# Patient Record
Sex: FEMALE | Race: WHITE | NOT HISPANIC OR LATINO | Employment: FULL TIME | URBAN - METROPOLITAN AREA
[De-identification: names, ages, dates, MRNs, and addresses within clinical notes are randomized per-mention and may not be internally consistent; named-entity substitution may affect disease eponyms.]

---

## 2020-07-07 ENCOUNTER — TRANSCRIBE ORDERS (OUTPATIENT)
Dept: ADMINISTRATIVE | Facility: HOSPITAL | Age: 40
End: 2020-07-07

## 2020-07-07 DIAGNOSIS — Z12.31 VISIT FOR SCREENING MAMMOGRAM: Primary | ICD-10-CM

## 2020-10-12 ENCOUNTER — APPOINTMENT (OUTPATIENT)
Dept: OTHER | Facility: HOSPITAL | Age: 40
End: 2020-10-12

## 2020-10-12 ENCOUNTER — HOSPITAL ENCOUNTER (OUTPATIENT)
Dept: MAMMOGRAPHY | Facility: HOSPITAL | Age: 40
Discharge: HOME | End: 2020-10-12
Admitting: STUDENT IN AN ORGANIZED HEALTH CARE EDUCATION/TRAINING PROGRAM

## 2020-10-12 DIAGNOSIS — Z12.31 VISIT FOR SCREENING MAMMOGRAM: ICD-10-CM

## 2020-10-12 PROCEDURE — 77063 BREAST TOMOSYNTHESIS BI: CPT

## 2020-10-12 PROCEDURE — 77067 SCR MAMMO BI INCL CAD: CPT

## 2020-10-12 PROCEDURE — 77063 BREAST TOMOSYNTHESIS BI: CPT | Performed by: RADIOLOGY

## 2020-10-12 PROCEDURE — 77067 SCR MAMMO BI INCL CAD: CPT | Performed by: RADIOLOGY

## 2021-05-06 ENCOUNTER — HOSPITAL ENCOUNTER (EMERGENCY)
Facility: HOSPITAL | Age: 41
Discharge: HOME | End: 2021-05-06
Attending: EMERGENCY MEDICINE | Admitting: EMERGENCY MEDICINE

## 2021-05-06 ENCOUNTER — APPOINTMENT (OUTPATIENT)
Dept: CT IMAGING | Facility: HOSPITAL | Age: 41
End: 2021-05-06

## 2021-05-06 ENCOUNTER — APPOINTMENT (OUTPATIENT)
Dept: GENERAL RADIOLOGY | Facility: HOSPITAL | Age: 41
End: 2021-05-06

## 2021-05-06 VITALS
TEMPERATURE: 98.7 F | BODY MASS INDEX: 31.23 KG/M2 | OXYGEN SATURATION: 96 % | HEIGHT: 67 IN | SYSTOLIC BLOOD PRESSURE: 123 MMHG | WEIGHT: 199 LBS | DIASTOLIC BLOOD PRESSURE: 75 MMHG | HEART RATE: 78 BPM | RESPIRATION RATE: 18 BRPM

## 2021-05-06 DIAGNOSIS — R07.9 CHEST PAIN, UNSPECIFIED TYPE: ICD-10-CM

## 2021-05-06 DIAGNOSIS — R06.02 SOB (SHORTNESS OF BREATH): ICD-10-CM

## 2021-05-06 DIAGNOSIS — M54.50 ACUTE LOW BACK PAIN WITHOUT SCIATICA, UNSPECIFIED BACK PAIN LATERALITY: Primary | ICD-10-CM

## 2021-05-06 LAB
ALBUMIN SERPL-MCNC: 4.3 G/DL (ref 3.5–5.2)
ALBUMIN/GLOB SERPL: 1.3 G/DL
ALP SERPL-CCNC: 108 U/L (ref 39–117)
ALT SERPL W P-5'-P-CCNC: 17 U/L (ref 1–33)
ANION GAP SERPL CALCULATED.3IONS-SCNC: 12 MMOL/L (ref 5–15)
AST SERPL-CCNC: 15 U/L (ref 1–32)
B-HCG UR QL: NEGATIVE
BASOPHILS # BLD AUTO: 0.07 10*3/MM3 (ref 0–0.2)
BASOPHILS NFR BLD AUTO: 0.5 % (ref 0–1.5)
BILIRUB SERPL-MCNC: 0.4 MG/DL (ref 0–1.2)
BUN SERPL-MCNC: 13 MG/DL (ref 6–20)
BUN/CREAT SERPL: 14.4 (ref 7–25)
CALCIUM SPEC-SCNC: 9.6 MG/DL (ref 8.6–10.5)
CHLORIDE SERPL-SCNC: 102 MMOL/L (ref 98–107)
CO2 SERPL-SCNC: 25 MMOL/L (ref 22–29)
CREAT SERPL-MCNC: 0.9 MG/DL (ref 0.57–1)
D DIMER PPP FEU-MCNC: 0.59 MCGFEU/ML (ref 0–0.56)
DEPRECATED RDW RBC AUTO: 44.9 FL (ref 37–54)
EOSINOPHIL # BLD AUTO: 0.06 10*3/MM3 (ref 0–0.4)
EOSINOPHIL NFR BLD AUTO: 0.5 % (ref 0.3–6.2)
ERYTHROCYTE [DISTWIDTH] IN BLOOD BY AUTOMATED COUNT: 14.7 % (ref 12.3–15.4)
FLUAV RNA RESP QL NAA+PROBE: NOT DETECTED
FLUBV RNA RESP QL NAA+PROBE: NOT DETECTED
GFR SERPL CREATININE-BSD FRML MDRD: 69 ML/MIN/1.73
GLOBULIN UR ELPH-MCNC: 3.3 GM/DL
GLUCOSE SERPL-MCNC: 89 MG/DL (ref 65–99)
HCT VFR BLD AUTO: 39.7 % (ref 34–46.6)
HGB BLD-MCNC: 12.5 G/DL (ref 12–15.9)
HOLD SPECIMEN: NORMAL
IMM GRANULOCYTES # BLD AUTO: 0.05 10*3/MM3 (ref 0–0.05)
IMM GRANULOCYTES NFR BLD AUTO: 0.4 % (ref 0–0.5)
INTERNAL NEGATIVE CONTROL: NEGATIVE
INTERNAL POSITIVE CONTROL: POSITIVE
LYMPHOCYTES # BLD AUTO: 1.72 10*3/MM3 (ref 0.7–3.1)
LYMPHOCYTES NFR BLD AUTO: 12.9 % (ref 19.6–45.3)
Lab: NORMAL
MCH RBC QN AUTO: 26 PG (ref 26.6–33)
MCHC RBC AUTO-ENTMCNC: 31.5 G/DL (ref 31.5–35.7)
MCV RBC AUTO: 82.5 FL (ref 79–97)
MONOCYTES # BLD AUTO: 0.58 10*3/MM3 (ref 0.1–0.9)
MONOCYTES NFR BLD AUTO: 4.4 % (ref 5–12)
NEUTROPHILS NFR BLD AUTO: 10.84 10*3/MM3 (ref 1.7–7)
NEUTROPHILS NFR BLD AUTO: 81.3 % (ref 42.7–76)
NRBC BLD AUTO-RTO: 0 /100 WBC (ref 0–0.2)
NT-PROBNP SERPL-MCNC: 243.4 PG/ML (ref 0–450)
PLATELET # BLD AUTO: 321 10*3/MM3 (ref 140–450)
PMV BLD AUTO: 10.1 FL (ref 6–12)
POTASSIUM SERPL-SCNC: 4 MMOL/L (ref 3.5–5.2)
PROT SERPL-MCNC: 7.6 G/DL (ref 6–8.5)
QT INTERVAL: 368 MS
QTC INTERVAL: 434 MS
RBC # BLD AUTO: 4.81 10*6/MM3 (ref 3.77–5.28)
SARS-COV-2 RNA RESP QL NAA+PROBE: NOT DETECTED
SODIUM SERPL-SCNC: 139 MMOL/L (ref 136–145)
TROPONIN T SERPL-MCNC: <0.01 NG/ML (ref 0–0.03)
WBC # BLD AUTO: 13.32 10*3/MM3 (ref 3.4–10.8)
WHOLE BLOOD HOLD SPECIMEN: NORMAL
WHOLE BLOOD HOLD SPECIMEN: NORMAL

## 2021-05-06 PROCEDURE — 84484 ASSAY OF TROPONIN QUANT: CPT

## 2021-05-06 PROCEDURE — 85025 COMPLETE CBC W/AUTO DIFF WBC: CPT

## 2021-05-06 PROCEDURE — 71275 CT ANGIOGRAPHY CHEST: CPT

## 2021-05-06 PROCEDURE — 93005 ELECTROCARDIOGRAM TRACING: CPT

## 2021-05-06 PROCEDURE — 83880 ASSAY OF NATRIURETIC PEPTIDE: CPT

## 2021-05-06 PROCEDURE — 99284 EMERGENCY DEPT VISIT MOD MDM: CPT

## 2021-05-06 PROCEDURE — 96374 THER/PROPH/DIAG INJ IV PUSH: CPT

## 2021-05-06 PROCEDURE — 80053 COMPREHEN METABOLIC PANEL: CPT

## 2021-05-06 PROCEDURE — 85379 FIBRIN DEGRADATION QUANT: CPT | Performed by: PHYSICIAN ASSISTANT

## 2021-05-06 PROCEDURE — 93005 ELECTROCARDIOGRAM TRACING: CPT | Performed by: EMERGENCY MEDICINE

## 2021-05-06 PROCEDURE — 81025 URINE PREGNANCY TEST: CPT | Performed by: EMERGENCY MEDICINE

## 2021-05-06 PROCEDURE — 87636 SARSCOV2 & INF A&B AMP PRB: CPT | Performed by: PHYSICIAN ASSISTANT

## 2021-05-06 PROCEDURE — 25010000002 KETOROLAC TROMETHAMINE PER 15 MG: Performed by: PHYSICIAN ASSISTANT

## 2021-05-06 PROCEDURE — 71045 X-RAY EXAM CHEST 1 VIEW: CPT

## 2021-05-06 PROCEDURE — 96375 TX/PRO/DX INJ NEW DRUG ADDON: CPT

## 2021-05-06 PROCEDURE — 0 IOPAMIDOL PER 1 ML: Performed by: EMERGENCY MEDICINE

## 2021-05-06 RX ORDER — SODIUM CHLORIDE 0.9 % (FLUSH) 0.9 %
10 SYRINGE (ML) INJECTION AS NEEDED
Status: DISCONTINUED | OUTPATIENT
Start: 2021-05-06 | End: 2021-05-06 | Stop reason: HOSPADM

## 2021-05-06 RX ORDER — BUPROPION HYDROCHLORIDE 150 MG/1
450 TABLET ORAL DAILY
COMMUNITY

## 2021-05-06 RX ORDER — KETOROLAC TROMETHAMINE 30 MG/ML
30 INJECTION, SOLUTION INTRAMUSCULAR; INTRAVENOUS ONCE
Status: COMPLETED | OUTPATIENT
Start: 2021-05-06 | End: 2021-05-06

## 2021-05-06 RX ORDER — SERTRALINE HYDROCHLORIDE 100 MG/1
200 TABLET, FILM COATED ORAL DAILY
COMMUNITY

## 2021-05-06 RX ORDER — FAMOTIDINE 10 MG/ML
20 INJECTION, SOLUTION INTRAVENOUS ONCE
Status: COMPLETED | OUTPATIENT
Start: 2021-05-06 | End: 2021-05-06

## 2021-05-06 RX ADMIN — KETOROLAC TROMETHAMINE 30 MG: 30 INJECTION, SOLUTION INTRAMUSCULAR; INTRAVENOUS at 14:13

## 2021-05-06 RX ADMIN — FAMOTIDINE 20 MG: 10 INJECTION, SOLUTION INTRAVENOUS at 14:15

## 2021-05-06 RX ADMIN — IOPAMIDOL 75 ML: 755 INJECTION, SOLUTION INTRAVENOUS at 16:23

## 2021-10-08 ENCOUNTER — TRANSCRIBE ORDERS (OUTPATIENT)
Dept: ADMINISTRATIVE | Facility: HOSPITAL | Age: 41
End: 2021-10-08

## 2021-10-08 DIAGNOSIS — Z12.31 VISIT FOR SCREENING MAMMOGRAM: Primary | ICD-10-CM

## 2021-11-22 ENCOUNTER — APPOINTMENT (OUTPATIENT)
Dept: MAMMOGRAPHY | Facility: HOSPITAL | Age: 41
End: 2021-11-22

## 2021-12-28 ENCOUNTER — HOSPITAL ENCOUNTER (OUTPATIENT)
Dept: MAMMOGRAPHY | Facility: HOSPITAL | Age: 41
Discharge: HOME | End: 2021-12-28
Admitting: ADVANCED PRACTICE MIDWIFE

## 2021-12-28 DIAGNOSIS — Z12.31 VISIT FOR SCREENING MAMMOGRAM: ICD-10-CM

## 2021-12-28 PROCEDURE — 77067 SCR MAMMO BI INCL CAD: CPT

## 2021-12-28 PROCEDURE — 77063 BREAST TOMOSYNTHESIS BI: CPT

## 2021-12-28 PROCEDURE — 77067 SCR MAMMO BI INCL CAD: CPT | Performed by: RADIOLOGY

## 2021-12-28 PROCEDURE — 77063 BREAST TOMOSYNTHESIS BI: CPT | Performed by: RADIOLOGY

## 2022-02-04 ENCOUNTER — TRANSCRIBE ORDERS (OUTPATIENT)
Dept: ADMINISTRATIVE | Facility: HOSPITAL | Age: 42
End: 2022-02-04

## 2022-02-04 DIAGNOSIS — Z82.49 FAMILY HISTORY OF HYPERTROPHIC CARDIOMYOPATHY: Primary | ICD-10-CM

## 2022-02-11 ENCOUNTER — HOSPITAL ENCOUNTER (OUTPATIENT)
Dept: CARDIOLOGY | Facility: HOSPITAL | Age: 42
Discharge: HOME | End: 2022-02-11
Admitting: STUDENT IN AN ORGANIZED HEALTH CARE EDUCATION/TRAINING PROGRAM

## 2022-02-11 DIAGNOSIS — Z82.49 FAMILY HISTORY OF HYPERTROPHIC CARDIOMYOPATHY: ICD-10-CM

## 2022-02-11 LAB
BH CV ECHO MEAS - AO MAX PG (FULL): 3.9 MMHG
BH CV ECHO MEAS - AO MAX PG: 11 MMHG
BH CV ECHO MEAS - AO MEAN PG (FULL): 2.3 MMHG
BH CV ECHO MEAS - AO MEAN PG: 5.6 MMHG
BH CV ECHO MEAS - AO ROOT AREA (BSA CORRECTED): 1.6
BH CV ECHO MEAS - AO ROOT AREA: 8.4 CM^2
BH CV ECHO MEAS - AO ROOT DIAM: 3.3 CM
BH CV ECHO MEAS - AO V2 MAX: 165.6 CM/SEC
BH CV ECHO MEAS - AO V2 MEAN: 110.6 CM/SEC
BH CV ECHO MEAS - AO V2 VTI: 38.9 CM
BH CV ECHO MEAS - AVA(I,A): 2 CM^2
BH CV ECHO MEAS - AVA(I,D): 2 CM^2
BH CV ECHO MEAS - AVA(V,A): 1.9 CM^2
BH CV ECHO MEAS - AVA(V,D): 1.9 CM^2
BH CV ECHO MEAS - BSA(HAYCOCK): 2.1 M^2
BH CV ECHO MEAS - BSA: 2 M^2
BH CV ECHO MEAS - BZI_BMI: 31.2 KILOGRAMS/M^2
BH CV ECHO MEAS - BZI_METRIC_HEIGHT: 170.2 CM
BH CV ECHO MEAS - BZI_METRIC_WEIGHT: 90.3 KG
BH CV ECHO MEAS - EDV(CUBED): 33.2 ML
BH CV ECHO MEAS - EDV(MOD-SP2): 160 ML
BH CV ECHO MEAS - EDV(MOD-SP4): 134 ML
BH CV ECHO MEAS - EDV(TEICH): 41.4 ML
BH CV ECHO MEAS - EF(CUBED): 3.9 %
BH CV ECHO MEAS - EF(MOD-SP2): 54.4 %
BH CV ECHO MEAS - EF(MOD-SP4): 46.3 %
BH CV ECHO MEAS - EF(TEICH): 3.2 %
BH CV ECHO MEAS - ESV(CUBED): 31.9 ML
BH CV ECHO MEAS - ESV(MOD-SP2): 73 ML
BH CV ECHO MEAS - ESV(MOD-SP4): 72 ML
BH CV ECHO MEAS - ESV(TEICH): 40.1 ML
BH CV ECHO MEAS - FS: 1.3 %
BH CV ECHO MEAS - IVS/LVPW: 3.3
BH CV ECHO MEAS - IVSD: 3 CM
BH CV ECHO MEAS - LA DIMENSION: 3.8 CM
BH CV ECHO MEAS - LA/AO: 1.2
BH CV ECHO MEAS - LAD MAJOR: 5.5 CM
BH CV ECHO MEAS - LAT PEAK E' VEL: 14.3 CM/SEC
BH CV ECHO MEAS - LATERAL E/E' RATIO: 7.8
BH CV ECHO MEAS - LV DIASTOLIC VOL/BSA (35-75): 66.4 ML/M^2
BH CV ECHO MEAS - LV MASS(C)D: 268 GRAMS
BH CV ECHO MEAS - LV MASS(C)DI: 132.8 GRAMS/M^2
BH CV ECHO MEAS - LV MAX PG: 7.1 MMHG
BH CV ECHO MEAS - LV MEAN PG: 3.3 MMHG
BH CV ECHO MEAS - LV SYSTOLIC VOL/BSA (12-30): 35.7 ML/M^2
BH CV ECHO MEAS - LV V1 MAX: 132.8 CM/SEC
BH CV ECHO MEAS - LV V1 MEAN: 82 CM/SEC
BH CV ECHO MEAS - LV V1 VTI: 33.5 CM
BH CV ECHO MEAS - LVIDD: 3.2 CM
BH CV ECHO MEAS - LVIDS: 3.2 CM
BH CV ECHO MEAS - LVLD AP2: 9.4 CM
BH CV ECHO MEAS - LVLD AP4: 9.1 CM
BH CV ECHO MEAS - LVLS AP2: 7.5 CM
BH CV ECHO MEAS - LVLS AP4: 7.3 CM
BH CV ECHO MEAS - LVOT AREA (M): 2.3 CM^2
BH CV ECHO MEAS - LVOT AREA: 2.4 CM^2
BH CV ECHO MEAS - LVOT DIAM: 1.7 CM
BH CV ECHO MEAS - LVPWD: 0.89 CM
BH CV ECHO MEAS - MED PEAK E' VEL: 11.4 CM/SEC
BH CV ECHO MEAS - MEDIAL E/E' RATIO: 9.7
BH CV ECHO MEAS - MV A MAX VEL: 87.4 CM/SEC
BH CV ECHO MEAS - MV DEC SLOPE: 453.5 CM/SEC^2
BH CV ECHO MEAS - MV DEC TIME: 0.17 SEC
BH CV ECHO MEAS - MV E MAX VEL: 112 CM/SEC
BH CV ECHO MEAS - MV E/A: 1.3
BH CV ECHO MEAS - MV MAX PG: 4.4 MMHG
BH CV ECHO MEAS - MV MEAN PG: 1.8 MMHG
BH CV ECHO MEAS - MV P1/2T MAX VEL: 115.4 CM/SEC
BH CV ECHO MEAS - MV P1/2T: 74.5 MSEC
BH CV ECHO MEAS - MV V2 MAX: 104.5 CM/SEC
BH CV ECHO MEAS - MV V2 MEAN: 61 CM/SEC
BH CV ECHO MEAS - MV V2 VTI: 30.5 CM
BH CV ECHO MEAS - MVA P1/2T LCG: 1.9 CM^2
BH CV ECHO MEAS - MVA(P1/2T): 3 CM^2
BH CV ECHO MEAS - MVA(VTI): 2.6 CM^2
BH CV ECHO MEAS - PA ACC SLOPE: 583 CM/SEC^2
BH CV ECHO MEAS - PA ACC TIME: 0.15 SEC
BH CV ECHO MEAS - PA PR(ACCEL): 9.3 MMHG
BH CV ECHO MEAS - SI(AO): 161.2 ML/M^2
BH CV ECHO MEAS - SI(CUBED): 0.64 ML/M^2
BH CV ECHO MEAS - SI(LVOT): 39.4 ML/M^2
BH CV ECHO MEAS - SI(MOD-SP2): 43.1 ML/M^2
BH CV ECHO MEAS - SI(MOD-SP4): 30.7 ML/M^2
BH CV ECHO MEAS - SI(TEICH): 0.65 ML/M^2
BH CV ECHO MEAS - SV(AO): 325.2 ML
BH CV ECHO MEAS - SV(CUBED): 1.3 ML
BH CV ECHO MEAS - SV(LVOT): 79.4 ML
BH CV ECHO MEAS - SV(MOD-SP2): 87 ML
BH CV ECHO MEAS - SV(MOD-SP4): 62 ML
BH CV ECHO MEAS - SV(TEICH): 1.3 ML
BH CV ECHO MEAS - TAPSE (>1.6): 2.2 CM
BH CV ECHO MEASUREMENTS AVERAGE E/E' RATIO: 8.72
BH CV VAS BP LEFT ARM: NORMAL MMHG
BH CV XLRA - RV BASE: 4.5 CM
BH CV XLRA - RV LENGTH: 5.6 CM
BH CV XLRA - RV MID: 3.2 CM
BH CV XLRA - TDI S': 17.4 CM/SEC
LEFT ATRIUM VOLUME INDEX: 28.2 ML/M^2
LEFT ATRIUM VOLUME: 57 ML
MAXIMAL PREDICTED HEART RATE: 179 BPM
STRESS TARGET HR: 152 BPM

## 2022-02-11 PROCEDURE — 93306 TTE W/DOPPLER COMPLETE: CPT

## 2022-02-11 PROCEDURE — 93306 TTE W/DOPPLER COMPLETE: CPT | Performed by: INTERNAL MEDICINE

## 2022-03-07 ENCOUNTER — HOSPITAL ENCOUNTER (OUTPATIENT)
Dept: GENERAL RADIOLOGY | Facility: HOSPITAL | Age: 42
Discharge: HOME | End: 2022-03-07
Admitting: STUDENT IN AN ORGANIZED HEALTH CARE EDUCATION/TRAINING PROGRAM

## 2022-03-07 ENCOUNTER — TRANSCRIBE ORDERS (OUTPATIENT)
Dept: ADMINISTRATIVE | Facility: HOSPITAL | Age: 42
End: 2022-03-07

## 2022-03-07 DIAGNOSIS — M79.89 SWELLING OF LEFT LITTLE FINGER: Primary | ICD-10-CM

## 2022-03-07 PROCEDURE — 73120 X-RAY EXAM OF HAND: CPT

## 2022-04-06 ENCOUNTER — APPOINTMENT (OUTPATIENT)
Dept: GENERAL RADIOLOGY | Facility: HOSPITAL | Age: 42
End: 2022-04-06

## 2022-04-06 ENCOUNTER — HOSPITAL ENCOUNTER (EMERGENCY)
Facility: HOSPITAL | Age: 42
Discharge: HOME | End: 2022-04-06
Attending: EMERGENCY MEDICINE | Admitting: EMERGENCY MEDICINE

## 2022-04-06 VITALS
HEIGHT: 67 IN | TEMPERATURE: 97.8 F | HEART RATE: 75 BPM | BODY MASS INDEX: 31.39 KG/M2 | OXYGEN SATURATION: 97 % | SYSTOLIC BLOOD PRESSURE: 148 MMHG | DIASTOLIC BLOOD PRESSURE: 82 MMHG | WEIGHT: 200 LBS | RESPIRATION RATE: 16 BRPM

## 2022-04-06 DIAGNOSIS — S09.90XA INJURY OF HEAD, INITIAL ENCOUNTER: ICD-10-CM

## 2022-04-06 DIAGNOSIS — S00.33XA CONTUSION OF NOSE, INITIAL ENCOUNTER: Primary | ICD-10-CM

## 2022-04-06 PROCEDURE — 70150 X-RAY EXAM OF FACIAL BONES: CPT

## 2022-04-06 PROCEDURE — 99282 EMERGENCY DEPT VISIT SF MDM: CPT

## 2022-04-06 NOTE — ED PROVIDER NOTES
Subjective   Pt is a 42 yo female presenting to ED with complaints of nose pain. Pt reports PTA was at work and during a game accidentally had her face collide with a coworker. She initially had a nosebleed but that resolved. She has swelling and bruising to her nose. She denies LOC. She denies taking blood thinners. She denies vision changes, confusion, headache, dizziness, neck pain or any other complaints.       History provided by:  Patient and medical records      Review of Systems   Constitutional: Negative for fever.   HENT: Positive for facial swelling and nosebleeds. Negative for congestion.    Eyes: Negative for visual disturbance.   Respiratory: Negative for cough and shortness of breath.    Cardiovascular: Negative for chest pain.   Musculoskeletal: Negative for arthralgias, back pain and neck pain.   Skin: Negative for wound.   Neurological: Negative for dizziness, syncope, weakness, numbness and headaches.   Psychiatric/Behavioral: Negative for confusion.       No past medical history on file.    No Known Allergies    No past surgical history on file.    Family History   Problem Relation Age of Onset   • Breast cancer Maternal Aunt 64   • Breast cancer Paternal Aunt 54   • Breast cancer Maternal Great-Grandmother 80   • Breast cancer Paternal Great-Grandmother 62   • Breast cancer Paternal Grandmother         60's   • Breast cancer Maternal Aunt 58   • Breast cancer Maternal Aunt 60   • Ovarian cancer Neg Hx        Social History     Socioeconomic History   • Marital status:    Tobacco Use   • Smoking status: Never Smoker   Substance and Sexual Activity   • Alcohol use: Yes     Comment: 5 drinks a week   • Drug use: Not Currently   • Sexual activity: Defer           Objective   Physical Exam  Vitals and nursing note reviewed.   Constitutional:       General: She is not in acute distress.     Appearance: She is normal weight.   HENT:      Nose: Nasal deformity (slight deviation to left), signs of  injury and nasal tenderness (contusion / swelling) present. No laceration.      Right Nostril: Epistaxis (dried blood) present. No septal hematoma.      Left Nostril: Epistaxis (dried blood) present. No septal hematoma.      Mouth/Throat:      Mouth: Mucous membranes are moist.   Eyes:      Extraocular Movements: Extraocular movements intact.      Conjunctiva/sclera: Conjunctivae normal.      Pupils: Pupils are equal, round, and reactive to light.   Cardiovascular:      Rate and Rhythm: Normal rate.   Pulmonary:      Effort: Pulmonary effort is normal. No respiratory distress.   Musculoskeletal:         General: Normal range of motion.      Cervical back: Normal range of motion and neck supple. No tenderness. Normal range of motion.   Skin:     General: Skin is warm.   Neurological:      General: No focal deficit present.      Mental Status: She is alert and oriented to person, place, and time.   Psychiatric:         Mood and Affect: Mood normal.         Behavior: Behavior normal.         Procedures           ED Course      Discussed results and tx plan. Discussed ice / Tylenol and Motrin. Discussed f/u with ENT.     No results found for this or any previous visit (from the past 24 hour(s)).  Note: In addition to lab results from this visit, the labs listed above may include labs taken at another facility or during a different encounter within the last 24 hours. Please correlate lab times with ED admission and discharge times for further clarification of the services performed during this visit.    XR Facial Bones 3+ View   Final Result   No evidence of fracture in the nasal bone or elsewhere, no evidence of   trauma.       Rounded 2 cm lesion inferior right maxillary sinus most likely a mucus   retention cyst       This report was finalized on 4/6/2022 5:18 PM by Damon Encarnaicon.            Vitals:    04/06/22 1629   BP: 148/82   BP Location: Left arm   Patient Position: Sitting   Pulse: 75   Resp: 16   Temp: 97.8 °F  "(36.6 °C)   TempSrc: Oral   SpO2: 97%   Weight: 90.7 kg (200 lb)   Height: 170.2 cm (67\")     Medications - No data to display  ECG/EMG Results (last 24 hours)     ** No results found for the last 24 hours. **        No orders to display       DISCHARGE    Patient discharged in stable condition.    Reviewed implications of results, diagnosis, meds, responsibility to follow up, warning signs and symptoms of possible worsening, potential complications and reasons to return to ER.    Patient/Family voiced understanding of above instructions.    Discussed plan for discharge, as there is no emergent indication for admission.  Pt/family is agreeable and understands need for follow up and possible repeat testing.  Pt/family is aware that discharge does not mean that nothing is wrong but that it indicates no emergency is currently present that requires admission and they must continue care with follow-up as given below or with a physician of their choice.     FOLLOW-UP  Susan Beck MD  [address] [address]  [phone]    Schedule an appointment as soon as possible for a visit       Enoch Garcia MD  [address] [place] [address] [address]  [place] [address]  [phone]      As needed    [facility] Emergency Department  [address] [id number] [phone]    If symptoms worsen         Medication List      No changes were made to your prescriptions during this visit.                                                  MDM    Final diagnoses:   Contusion of nose, initial encounter   Injury of head, initial encounter       ED Disposition  ED Disposition     ED Disposition   Discharge    Condition   Stable    Comment   --             Susan Beck MD  [address] [facility] [address]  [place] [address]  [phone]    Schedule an appointment as soon as possible for a visit       Enoch Garcia MD  [address] [facility] [address]  [place] [address]  [phone]      As needed    [facility] Emergency Department  [address] [facility] [address] [phone]    If symptoms worsen         Medication List      No changes were made to your prescriptions during this visit.          Jesusita Wilson PA  04/06/22 1808

## 2022-04-06 NOTE — DISCHARGE INSTRUCTIONS
PROVIDED NASAL FRACTURE INFORMATION JUST AS FYI. XRAY DID NOT SHOW ANY NASAL FRACTURE IN ED TODAY.

## 2022-06-28 ENCOUNTER — HOSPITAL ENCOUNTER (OUTPATIENT)
Dept: MRI IMAGING | Facility: HOSPITAL | Age: 42
Discharge: HOME | End: 2022-06-28

## 2022-06-28 DIAGNOSIS — Z80.3 FAMILY HISTORY OF MALIGNANT NEOPLASM OF BREAST: ICD-10-CM

## 2022-06-28 PROCEDURE — 77049 MRI BREAST C-+ W/CAD BI: CPT

## 2022-06-28 PROCEDURE — 0 GADOBENATE DIMEGLUMINE 529 MG/ML SOLUTION: Performed by: ADVANCED PRACTICE MIDWIFE

## 2022-06-28 PROCEDURE — A9577 INJ MULTIHANCE: HCPCS | Performed by: ADVANCED PRACTICE MIDWIFE

## 2022-06-28 PROCEDURE — 77049 MRI BREAST C-+ W/CAD BI: CPT | Performed by: RADIOLOGY

## 2022-06-28 RX ADMIN — GADOBENATE DIMEGLUMINE 20 ML: 529 INJECTION, SOLUTION INTRAVENOUS at 11:08

## 2022-06-29 ENCOUNTER — TELEPHONE (OUTPATIENT)
Dept: MRI IMAGING | Facility: HOSPITAL | Age: 42
End: 2022-06-29

## 2022-06-29 NOTE — TELEPHONE ENCOUNTER
Called patient with MRI Breast results. Recommended Dx MMG and og 2nd looks scheduled for 6/30 at 8:00 at the [address] b[address]. Pt not on BT.  Pt expressed understanding and was encouraged to call with any further questions or concerns.

## 2022-06-30 ENCOUNTER — HOSPITAL ENCOUNTER (OUTPATIENT)
Dept: ULTRASOUND IMAGING | Facility: HOSPITAL | Age: 42
Discharge: HOME | End: 2022-06-30

## 2022-06-30 ENCOUNTER — HOSPITAL ENCOUNTER (OUTPATIENT)
Dept: MAMMOGRAPHY | Facility: HOSPITAL | Age: 42
Discharge: HOME | End: 2022-06-30

## 2022-06-30 DIAGNOSIS — Z80.3 FAMILY HISTORY OF MALIGNANT NEOPLASM OF BREAST: ICD-10-CM

## 2022-06-30 DIAGNOSIS — R92.8 ABNORMAL MAGNETIC RESONANCE IMAGING OF BREAST: ICD-10-CM

## 2022-06-30 PROCEDURE — G0279 TOMOSYNTHESIS, MAMMO: HCPCS

## 2022-06-30 PROCEDURE — 77066 DX MAMMO INCL CAD BI: CPT

## 2022-06-30 PROCEDURE — 76642 ULTRASOUND BREAST LIMITED: CPT

## 2022-06-30 PROCEDURE — 77066 DX MAMMO INCL CAD BI: CPT | Performed by: RADIOLOGY

## 2022-06-30 PROCEDURE — 76642 ULTRASOUND BREAST LIMITED: CPT | Performed by: RADIOLOGY

## 2022-06-30 PROCEDURE — 77062 BREAST TOMOSYNTHESIS BI: CPT | Performed by: RADIOLOGY

## 2022-07-01 ENCOUNTER — TELEPHONE (OUTPATIENT)
Dept: MRI IMAGING | Facility: HOSPITAL | Age: 42
End: 2022-07-01

## 2022-07-01 NOTE — TELEPHONE ENCOUNTER
Called pt and she is on vacation on 7/20 so will be scheduled for 7.27 at 8:00 for her MRI biopsies. Procedures explained in deatil. Pt expressed understanding.

## 2022-07-27 ENCOUNTER — HOSPITAL ENCOUNTER (OUTPATIENT)
Dept: MRI IMAGING | Facility: HOSPITAL | Age: 42
Discharge: HOME | End: 2022-07-27

## 2022-07-27 ENCOUNTER — HOSPITAL ENCOUNTER (OUTPATIENT)
Dept: MAMMOGRAPHY | Facility: HOSPITAL | Age: 42
Discharge: HOME | End: 2022-07-27

## 2022-07-27 DIAGNOSIS — R92.8 ABNORMAL FINDINGS ON DIAGNOSTIC IMAGING OF BREAST: ICD-10-CM

## 2022-07-27 PROCEDURE — 0 LIDOCAINE 1 % SOLUTION: Performed by: RADIOLOGY

## 2022-07-27 PROCEDURE — A4648 IMPLANTABLE TISSUE MARKER: HCPCS

## 2022-07-27 PROCEDURE — A9577 INJ MULTIHANCE: HCPCS | Performed by: ADVANCED PRACTICE MIDWIFE

## 2022-07-27 PROCEDURE — 88305 TISSUE EXAM BY PATHOLOGIST: CPT | Performed by: RADIOLOGY

## 2022-07-27 PROCEDURE — 19085 BX BREAST 1ST LESION MR IMAG: CPT | Performed by: RADIOLOGY

## 2022-07-27 PROCEDURE — 0 GADOBENATE DIMEGLUMINE 529 MG/ML SOLUTION: Performed by: ADVANCED PRACTICE MIDWIFE

## 2022-07-27 PROCEDURE — 77065 DX MAMMO INCL CAD UNI: CPT | Performed by: RADIOLOGY

## 2022-07-27 RX ORDER — LIDOCAINE HYDROCHLORIDE AND EPINEPHRINE 10; 10 MG/ML; UG/ML
9 INJECTION, SOLUTION INFILTRATION; PERINEURAL
Status: COMPLETED | OUTPATIENT
Start: 2022-07-27 | End: 2022-07-27

## 2022-07-27 RX ORDER — LIDOCAINE HYDROCHLORIDE 10 MG/ML
2 INJECTION, SOLUTION INFILTRATION; PERINEURAL
Status: COMPLETED | OUTPATIENT
Start: 2022-07-27 | End: 2022-07-27

## 2022-07-27 RX ADMIN — LIDOCAINE HYDROCHLORIDE,EPINEPHRINE BITARTRATE 9 ML: 10; .01 INJECTION, SOLUTION INFILTRATION; PERINEURAL at 09:36

## 2022-07-27 RX ADMIN — GADOBENATE DIMEGLUMINE 18 ML: 529 INJECTION, SOLUTION INTRAVENOUS at 09:35

## 2022-07-27 RX ADMIN — Medication 2 ML: at 09:36

## 2022-07-28 LAB
CYTO UR: NORMAL
LAB AP CASE REPORT: NORMAL
LAB AP CLINICAL INFORMATION: NORMAL
LAB AP DIAGNOSIS COMMENT: NORMAL
PATH REPORT.FINAL DX SPEC: NORMAL
PATH REPORT.GROSS SPEC: NORMAL

## 2022-08-01 ENCOUNTER — TELEPHONE (OUTPATIENT)
Dept: MAMMOGRAPHY | Facility: HOSPITAL | Age: 42
End: 2022-08-01

## 2023-01-03 ENCOUNTER — APPOINTMENT (OUTPATIENT)
Dept: CT IMAGING | Facility: HOSPITAL | Age: 43
End: 2023-01-03
Payer: COMMERCIAL

## 2023-01-03 ENCOUNTER — APPOINTMENT (OUTPATIENT)
Dept: GENERAL RADIOLOGY | Facility: HOSPITAL | Age: 43
End: 2023-01-03
Payer: COMMERCIAL

## 2023-01-03 ENCOUNTER — HOSPITAL ENCOUNTER (EMERGENCY)
Facility: HOSPITAL | Age: 43
Discharge: HOME | End: 2023-01-03
Attending: EMERGENCY MEDICINE | Admitting: EMERGENCY MEDICINE
Payer: COMMERCIAL

## 2023-01-03 VITALS
DIASTOLIC BLOOD PRESSURE: 77 MMHG | TEMPERATURE: 98.4 F | SYSTOLIC BLOOD PRESSURE: 130 MMHG | HEART RATE: 84 BPM | OXYGEN SATURATION: 97 % | RESPIRATION RATE: 20 BRPM | BODY MASS INDEX: 31.39 KG/M2 | HEIGHT: 67 IN | WEIGHT: 200 LBS

## 2023-01-03 DIAGNOSIS — R07.9 CHEST PAIN, UNSPECIFIED TYPE: Primary | ICD-10-CM

## 2023-01-03 DIAGNOSIS — D72.829 LEUKOCYTOSIS, UNSPECIFIED TYPE: ICD-10-CM

## 2023-01-03 LAB
ALBUMIN SERPL-MCNC: 4.2 G/DL (ref 3.5–5.2)
ALBUMIN/GLOB SERPL: 1.1 G/DL
ALP SERPL-CCNC: 103 U/L (ref 39–117)
ALT SERPL W P-5'-P-CCNC: 17 U/L (ref 1–33)
ANION GAP SERPL CALCULATED.3IONS-SCNC: 12 MMOL/L (ref 5–15)
AST SERPL-CCNC: 15 U/L (ref 1–32)
BASOPHILS # BLD AUTO: 0.06 10*3/MM3 (ref 0–0.2)
BASOPHILS NFR BLD AUTO: 0.4 % (ref 0–1.5)
BILIRUB SERPL-MCNC: 0.5 MG/DL (ref 0–1.2)
BUN SERPL-MCNC: 13 MG/DL (ref 6–20)
BUN/CREAT SERPL: 15.7 (ref 7–25)
CALCIUM SPEC-SCNC: 9.6 MG/DL (ref 8.6–10.5)
CHLORIDE SERPL-SCNC: 102 MMOL/L (ref 98–107)
CO2 SERPL-SCNC: 24 MMOL/L (ref 22–29)
CREAT SERPL-MCNC: 0.83 MG/DL (ref 0.57–1)
DEPRECATED RDW RBC AUTO: 45.4 FL (ref 37–54)
EGFRCR SERPLBLD CKD-EPI 2021: 90.4 ML/MIN/1.73
EOSINOPHIL # BLD AUTO: 0.05 10*3/MM3 (ref 0–0.4)
EOSINOPHIL NFR BLD AUTO: 0.3 % (ref 0.3–6.2)
ERYTHROCYTE [DISTWIDTH] IN BLOOD BY AUTOMATED COUNT: 14.9 % (ref 12.3–15.4)
FLUAV RNA RESP QL NAA+PROBE: NOT DETECTED
FLUBV RNA RESP QL NAA+PROBE: NOT DETECTED
GLOBULIN UR ELPH-MCNC: 3.7 GM/DL
GLUCOSE SERPL-MCNC: 101 MG/DL (ref 65–99)
HCT VFR BLD AUTO: 38.6 % (ref 34–46.6)
HGB BLD-MCNC: 12.5 G/DL (ref 12–15.9)
HOLD SPECIMEN: NORMAL
IMM GRANULOCYTES # BLD AUTO: 0.09 10*3/MM3 (ref 0–0.05)
IMM GRANULOCYTES NFR BLD AUTO: 0.5 % (ref 0–0.5)
LIPASE SERPL-CCNC: 16 U/L (ref 13–60)
LYMPHOCYTES # BLD AUTO: 1.67 10*3/MM3 (ref 0.7–3.1)
LYMPHOCYTES NFR BLD AUTO: 10 % (ref 19.6–45.3)
MCH RBC QN AUTO: 26.9 PG (ref 26.6–33)
MCHC RBC AUTO-ENTMCNC: 32.4 G/DL (ref 31.5–35.7)
MCV RBC AUTO: 83.2 FL (ref 79–97)
MONOCYTES # BLD AUTO: 0.69 10*3/MM3 (ref 0.1–0.9)
MONOCYTES NFR BLD AUTO: 4.1 % (ref 5–12)
NEUTROPHILS NFR BLD AUTO: 14.16 10*3/MM3 (ref 1.7–7)
NEUTROPHILS NFR BLD AUTO: 84.7 % (ref 42.7–76)
NRBC BLD AUTO-RTO: 0 /100 WBC (ref 0–0.2)
NT-PROBNP SERPL-MCNC: 356.3 PG/ML (ref 0–450)
PLATELET # BLD AUTO: 239 10*3/MM3 (ref 140–450)
PMV BLD AUTO: 9.7 FL (ref 6–12)
POTASSIUM SERPL-SCNC: 3.7 MMOL/L (ref 3.5–5.2)
PROCALCITONIN SERPL-MCNC: 0.1 NG/ML (ref 0–0.25)
PROT SERPL-MCNC: 7.9 G/DL (ref 6–8.5)
QT INTERVAL: 352 MS
QT INTERVAL: 380 MS
QTC INTERVAL: 435 MS
QTC INTERVAL: 441 MS
RBC # BLD AUTO: 4.64 10*6/MM3 (ref 3.77–5.28)
SARS-COV-2 RNA RESP QL NAA+PROBE: NOT DETECTED
SODIUM SERPL-SCNC: 138 MMOL/L (ref 136–145)
TROPONIN T SERPL-MCNC: <0.01 NG/ML (ref 0–0.03)
TROPONIN T SERPL-MCNC: <0.01 NG/ML (ref 0–0.03)
WBC NRBC COR # BLD: 16.72 10*3/MM3 (ref 3.4–10.8)
WHOLE BLOOD HOLD COAG: NORMAL
WHOLE BLOOD HOLD SPECIMEN: NORMAL

## 2023-01-03 PROCEDURE — 84484 ASSAY OF TROPONIN QUANT: CPT | Performed by: EMERGENCY MEDICINE

## 2023-01-03 PROCEDURE — 0 IOPAMIDOL PER 1 ML: Performed by: EMERGENCY MEDICINE

## 2023-01-03 PROCEDURE — 80053 COMPREHEN METABOLIC PANEL: CPT | Performed by: EMERGENCY MEDICINE

## 2023-01-03 PROCEDURE — 87636 SARSCOV2 & INF A&B AMP PRB: CPT | Performed by: EMERGENCY MEDICINE

## 2023-01-03 PROCEDURE — 71275 CT ANGIOGRAPHY CHEST: CPT

## 2023-01-03 PROCEDURE — 84145 PROCALCITONIN (PCT): CPT | Performed by: EMERGENCY MEDICINE

## 2023-01-03 PROCEDURE — 93005 ELECTROCARDIOGRAM TRACING: CPT

## 2023-01-03 PROCEDURE — 85025 COMPLETE CBC W/AUTO DIFF WBC: CPT

## 2023-01-03 PROCEDURE — 83690 ASSAY OF LIPASE: CPT | Performed by: EMERGENCY MEDICINE

## 2023-01-03 PROCEDURE — 99284 EMERGENCY DEPT VISIT MOD MDM: CPT

## 2023-01-03 PROCEDURE — 71045 X-RAY EXAM CHEST 1 VIEW: CPT

## 2023-01-03 PROCEDURE — 83880 ASSAY OF NATRIURETIC PEPTIDE: CPT | Performed by: EMERGENCY MEDICINE

## 2023-01-03 PROCEDURE — 93005 ELECTROCARDIOGRAM TRACING: CPT | Performed by: EMERGENCY MEDICINE

## 2023-01-03 PROCEDURE — 36415 COLL VENOUS BLD VENIPUNCTURE: CPT

## 2023-01-03 RX ORDER — ASPIRIN 81 MG/1
324 TABLET, CHEWABLE ORAL ONCE
Status: COMPLETED | OUTPATIENT
Start: 2023-01-03 | End: 2023-01-03

## 2023-01-03 RX ORDER — SODIUM CHLORIDE 0.9 % (FLUSH) 0.9 %
10 SYRINGE (ML) INJECTION AS NEEDED
Status: DISCONTINUED | OUTPATIENT
Start: 2023-01-03 | End: 2023-01-03 | Stop reason: HOSPADM

## 2023-01-03 RX ADMIN — ASPIRIN 81 MG CHEWABLE TABLET 324 MG: 81 TABLET CHEWABLE at 12:01

## 2023-01-03 RX ADMIN — IOPAMIDOL 90 ML: 755 INJECTION, SOLUTION INTRAVENOUS at 13:37

## 2023-01-03 NOTE — DISCHARGE INSTRUCTIONS
Consider foam rolling to help with pain in the upper back.    No vigorous physical activity until seen by the chest pain center in follow-up.

## 2023-01-03 NOTE — ED PROVIDER NOTES
Subjective   History of Present Illness  This is a pleasant 42-year-old female [facility].  She is a .  No personal history of coronary artery disease but sounds like there is a family history of hypertrophic cardiomyopathy.  An echo in February 2022 was reassuring.  She is never had stress test or left heart catheterization.    She presents the emergency room today with substernal chest pain with radiation to the back that began on New Year's.  It is fairly intense in she woke up and had pain in her arms and felt numb in her fingers.  This went away though she has still pain between her shoulder blades.  She is never had anything like this in the past.  She thought it might be stress or anxiety but it has persisted she is worried about it and comes to the emergency department for further evaluation.    She did not have any numbness or tingling in her face and she had no feeling of impending doom with this and no problem swallowing and no issues in her feet.  She has had no exertional chest pain or dyspnea.  She is active but she does not exercise regularly.    She has had no fevers or chills.  She has had no cough or runny nose.  Bowel movements and urine to been normal.  She has a Mirena ring in and does not have periods.    She may have had a little bit of edema in her ankles at some point but is unsure currently.    She does report that this past year she had some elevated blood test perhaps including C-reactive protein was being followed her by her primary care doctor for that.    Currently she does not want anything for pain or nausea she is feeling improved.  No history of EGD or acid reflux that she knows of those she does occasionally take over-the-counter antiacids.  She is on a number of over-the-counter supplements but nothing new or different.    She also noticed some pain in her jaw but had seen the dentist recently and sounds like may have TMJ  dysfunction.    At home she has taken Tylenol and Advil at seem to work about 2 hours to relieve her pain.        Other systems are reviewed and are negative except as noted above.        Review of Systems   All other systems reviewed and are negative.      No past medical history on file.  Reading physician: Jose Sheppard III, MD Ordering physician: Susan Beck MD Study date: 22     Patient Information    Patient Name   Padma Arora MRN   [id number] Legal Sex   Female  (Age)   1980 (42 y.o.)     Patient Hx Of Height, Weight, and Vitals    Height Weight BSA (Calculated - sq m) BMI (Calculated) Retired BMI (kg/m2) Pulse BP              Blood Pressure at Time of Exam     Left Arm   Blood Pressure 131/84 mmHg            [employer] PACS Images     Show images for Adult Transthoracic Echo Complete W/ Cont if Necessary Per Protocol      Clinical Indication    Other Reasons - FM HX CARDIOMYOPATHY; Additional Reasons - FM HX CARDIOMYOPATHY; Reasons Uncertain in Most Circumstances - FM HX OF CARDIOMYOPATHY  - FM HX CARDIOMYOPATHY   Dx: Family history of hypertrophic cardiomyopathy [Z82.49 (ICD-10-CM)]     Interpretation Summary    • Left ventricular ejection fraction appears to be 56 - 60%. Left ventricular systolic function is normal.  • Left ventricular diastolic function was normal.  • Normal cardiac valves.          No Known Allergies    No past surgical history on file.    Family History   Problem Relation Age of Onset   • Breast cancer Maternal Aunt 64   • Breast cancer Paternal Aunt 54   • Breast cancer Maternal Great-Grandmother 80   • Breast cancer Paternal Great-Grandmother 62   • Breast cancer Paternal Grandmother         60's   • Breast cancer Maternal Aunt 58   • Breast cancer Maternal Aunt 60   • Ovarian cancer Neg Hx        Social History     Socioeconomic History   • Marital status:    Tobacco Use   • Smoking status: Never   Substance and Sexual Activity   • Alcohol use: Yes      Comment: 5 drinks a week   • Drug use: Not Currently   • Sexual activity: Defer           Objective   Physical Exam  Vitals and nursing note reviewed.   Constitutional:       Comments: A pleasant 42-year-old alert and oriented GCS 15.  She is in no distress.   HENT:      Head: Normocephalic and atraumatic.      Right Ear: External ear normal.      Left Ear: External ear normal.      Nose: Nose normal.      Mouth/Throat:      Mouth: Mucous membranes are moist.      Pharynx: Oropharynx is clear.   Eyes:      Extraocular Movements: Extraocular movements intact.      Conjunctiva/sclera: Conjunctivae normal.      Pupils: Pupils are equal, round, and reactive to light.   Cardiovascular:      Rate and Rhythm: Normal rate and regular rhythm.      Pulses: Normal pulses.      Heart sounds: Normal heart sounds.   Pulmonary:      Effort: Pulmonary effort is normal.      Breath sounds: Normal breath sounds.   Abdominal:      Comments: BMI is 31 she is soft and nontender without organomegaly, masses, or guarding.   Musculoskeletal:      Cervical back: Normal range of motion and neck supple.      Comments: Back is normal to inspection she is tender to palpation bilaterally in the paraspinal muscles without mass or rash she is tender all the way down into her low back.    Axilla and supraclavicular area without adenopathy.    Pulses equal in all 4 extremities 4/4 without synovitis, rash, edema or venous cords.   Skin:     General: Skin is warm and dry.      Capillary Refill: Capillary refill takes less than 2 seconds.   Neurological:      Mental Status: She is alert.      Comments: GCS 15.  Face symmetric, voice strong, tongue midline.  Vision, hearing, and speech are preserved.  No focal weakness.         Procedures           ED Course           Recent Results (from the past 24 hour(s))   ECG 12 Lead ED Triage Standing Order; Chest Pain    Collection Time: 01/03/23 11:46 AM   Result Value Ref Range    QT Interval 352 ms    QTC  Interval 435 ms   Troponin    Collection Time: 01/03/23 11:50 AM    Specimen: Blood   Result Value Ref Range    Troponin T <0.010 0.000 - 0.030 ng/mL   Comprehensive Metabolic Panel    Collection Time: 01/03/23 11:50 AM    Specimen: Blood   Result Value Ref Range    Glucose 101 (H) 65 - 99 mg/dL    BUN 13 6 - 20 mg/dL    Creatinine 0.83 0.57 - 1.00 mg/dL    Sodium 138 136 - 145 mmol/L    Potassium 3.7 3.5 - 5.2 mmol/L    Chloride 102 98 - 107 mmol/L    CO2 24.0 22.0 - 29.0 mmol/L    Calcium 9.6 8.6 - 10.5 mg/dL    Total Protein 7.9 6.0 - 8.5 g/dL    Albumin 4.2 3.5 - 5.2 g/dL    ALT (SGPT) 17 1 - 33 U/L    AST (SGOT) 15 1 - 32 U/L    Alkaline Phosphatase 103 39 - 117 U/L    Total Bilirubin 0.5 0.0 - 1.2 mg/dL    Globulin 3.7 gm/dL    A/G Ratio 1.1 g/dL    BUN/Creatinine Ratio 15.7 7.0 - 25.0    Anion Gap 12.0 5.0 - 15.0 mmol/L    eGFR 90.4 >60.0 mL/min/1.73   Lipase    Collection Time: 01/03/23 11:50 AM    Specimen: Blood   Result Value Ref Range    Lipase 16 13 - 60 U/L   BNP    Collection Time: 01/03/23 11:50 AM    Specimen: Blood   Result Value Ref Range    proBNP 356.3 0.0 - 450.0 pg/mL   Green Top (Gel)    Collection Time: 01/03/23 11:50 AM   Result Value Ref Range    Extra Tube Hold for add-ons.    Lavender Top    Collection Time: 01/03/23 11:50 AM   Result Value Ref Range    Extra Tube hold for add-on    Gold Top - SST    Collection Time: 01/03/23 11:50 AM   Result Value Ref Range    Extra Tube Hold for add-ons.    Light Blue Top    Collection Time: 01/03/23 11:50 AM   Result Value Ref Range    Extra Tube Hold for add-ons.    CBC Auto Differential    Collection Time: 01/03/23 11:50 AM    Specimen: Blood   Result Value Ref Range    WBC 16.72 (H) 3.40 - 10.80 10*3/mm3    RBC 4.64 3.77 - 5.28 10*6/mm3    Hemoglobin 12.5 12.0 - 15.9 g/dL    Hematocrit 38.6 34.0 - 46.6 %    MCV 83.2 79.0 - 97.0 fL    MCH 26.9 26.6 - 33.0 pg    MCHC 32.4 31.5 - 35.7 g/dL    RDW 14.9 12.3 - 15.4 %    RDW-SD 45.4 37.0 - 54.0 fl     MPV 9.7 6.0 - 12.0 fL    Platelets 239 140 - 450 10*3/mm3    Neutrophil % 84.7 (H) 42.7 - 76.0 %    Lymphocyte % 10.0 (L) 19.6 - 45.3 %    Monocyte % 4.1 (L) 5.0 - 12.0 %    Eosinophil % 0.3 0.3 - 6.2 %    Basophil % 0.4 0.0 - 1.5 %    Immature Grans % 0.5 0.0 - 0.5 %    Neutrophils, Absolute 14.16 (H) 1.70 - 7.00 10*3/mm3    Lymphocytes, Absolute 1.67 0.70 - 3.10 10*3/mm3    Monocytes, Absolute 0.69 0.10 - 0.90 10*3/mm3    Eosinophils, Absolute 0.05 0.00 - 0.40 10*3/mm3    Basophils, Absolute 0.06 0.00 - 0.20 10*3/mm3    Immature Grans, Absolute 0.09 (H) 0.00 - 0.05 10*3/mm3    nRBC 0.0 0.0 - 0.2 /100 WBC   Procalcitonin    Collection Time: 01/03/23 11:50 AM    Specimen: Blood   Result Value Ref Range    Procalcitonin 0.10 0.00 - 0.25 ng/mL   COVID-19 and FLU A/B PCR - Swab, Nasopharynx    Collection Time: 01/03/23  1:40 PM    Specimen: Nasopharynx; Swab   Result Value Ref Range    COVID19 Not Detected Not Detected - Ref. Range    Influenza A PCR Not Detected Not Detected    Influenza B PCR Not Detected Not Detected   Troponin    Collection Time: 01/03/23  1:41 PM    Specimen: Blood   Result Value Ref Range    Troponin T <0.010 0.000 - 0.030 ng/mL   ECG 12 Lead ED Triage Standing Order; Chest Pain    Collection Time: 01/03/23  2:15 PM   Result Value Ref Range    QT Interval 380 ms    QTC Interval 441 ms     Note: In addition to lab results from this visit, the labs listed above may include labs taken at another facility or during a different encounter within the last 24 hours. Please correlate lab times with ED admission and discharge times for further clarification of the services performed during this visit.    CT Angiogram Chest   Final Result   No acute aortic pathology. Normal appearance of the thoracic aorta.       No acute intrathoracic findings.       Mild lower lobe-predominant interlobular septal thickening which may   reflect trace interstitial edema.       This report was finalized on 1/3/2023 1:50 PM  by Armando Gomez MD.          XR Chest 1 View   Final Result   No active cardiopulmonary disease       This report was finalized on 1/3/2023 12:21 PM by Yash Teran.            Vitals:    01/03/23 1133 01/03/23 1230 01/03/23 1245 01/03/23 1400   BP: 160/89 133/80 134/75 130/77   BP Location: Left arm      Patient Position: Sitting      Pulse: 93 91 85 84   Resp: 20      Temp: 98.4 °F (36.9 °C)      TempSrc: Oral      SpO2: 100% 95% 94% 97%   Weight: 90.7 kg (200 lb)      Height: 170.2 cm (67\")        Medications   sodium chloride 0.9 % flush 10 mL (has no administration in time range)   aspirin chewable tablet 324 mg (324 mg Oral Given 1/3/23 1201)   iopamidol (ISOVUE-370) 76 % injection 100 mL (90 mL Intravenous Given 1/3/23 1337)     ECG/EMG Results (last 24 hours)     Procedure Component Value Units Date/Time    ECG 12 Lead ED Triage Standing Order; Chest Pain [[id number]] Collected: 01/03/23 1146     Updated: 01/03/23 1254     QT Interval 352 ms      QTC Interval 435 ms     Narrative:      Test Reason : ED Triage Standing Order~  Blood Pressure :   */*   mmHG  Vent. Rate :  92 BPM     Atrial Rate :  92 BPM     P-R Int : 154 ms          QRS Dur :  86 ms      QT Int : 352 ms       P-R-T Axes :  51  11  16 degrees     QTc Int : 435 ms    Normal sinus rhythm  Possible Left atrial enlargement  Borderline ECG  When compared with ECG of 06-MAY-2021 12:25,  No significant change was found  Confirmed by NEGRA PARSONS MD ([id number]) on 1/3/2023 12:54:35 PM    Referred By: EDMD           Confirmed By: NEGRA PARSONS MD        ECG 12 Lead ED Triage Standing Order; Chest Pain   Preliminary Result   Test Reason : ED Triage Standing Order~   Blood Pressure :   */*   mmHG   Vent. Rate :  81 BPM     Atrial Rate :  81 BPM      P-R Int : 164 ms          QRS Dur :  82 ms       QT Int : 380 ms       P-R-T Axes :  44   9  10 degrees      QTc Int : 441 ms      Normal sinus rhythm   Normal ECG   When compared with ECG of 03-JAN-2023  11:46,   No significant change was found      Referred By: PINKY           Confirmed By:       ECG 12 Lead ED Triage Standing Order; Chest Pain   Final Result   Test Reason : ED Triage Standing Order~   Blood Pressure :   */*   mmHG   Vent. Rate :  92 BPM     Atrial Rate :  92 BPM      P-R Int : 154 ms          QRS Dur :  86 ms       QT Int : 352 ms       P-R-T Axes :  51  11  16 degrees      QTc Int : 435 ms      Normal sinus rhythm   Possible Left atrial enlargement   Borderline ECG   When compared with ECG of 06-MAY-2021 12:25,   No significant change was found   Confirmed by NEGRA PARSONS MD ([id number]) on 1/3/2023 12:54:35 PM      Referred By: PINKY           Confirmed By: NEGRA PARSONS MD                                        Medical Decision Making        I reviewed all available studies at the bedside with the patient and her .  On recheck she is feeling improved.  Her heart score is 1.    She has never had previous restratification and I will refer her to the chest pain clinic for additional evaluation.    Interestingly she has a leukocytosis and had it previously but her procalcitonin is normal exact etiology is unclear and could be reactive but I will have her follow-up with her primary care doctor regarding this.    Certainly she has a component of muscle spasm in her back she talked about seeing a chiropractor for this and I talked her about foam rolling which may help as well.    Her CTA of the chest is reassuring and I personally reviewed the study she has no evidence of dissection or malignancy.  She has no pulmonary embolus.  She may have a component of anxiety/panic attack causing some of her symptoms as well.  She is on Zoloft as well as Wellbutrin currently and I will defer to her primary care provider regarding ongoing treatment of this.    She will return to the ED if worse in any way.    Are agreeable with the plan    Chest pain, unspecified type: undiagnosed new problem with uncertain  prognosis  Leukocytosis, unspecified type: undiagnosed new problem with uncertain prognosis  Amount and/or Complexity of Data Reviewed  Independent Historian: spouse  External Data Reviewed: labs, radiology, ECG and notes.  Labs: ordered. Decision-making details documented in ED Course.  Radiology: ordered and independent interpretation performed.  ECG/medicine tests: ordered and independent interpretation performed. Decision-making details documented in ED Course.      Risk  OTC drugs.  Prescription drug management.          Final diagnoses:   Chest pain, unspecified type   Leukocytosis, unspecified type       ED Disposition  ED Disposition     ED Disposition   Discharge    Condition   Stable    Comment   --             Susan Beck MD  [address] [facility] [address]  [place] [id number]  [phone]    Schedule an appointment as soon as possible for a visit       [facility] CARDIOLOGY  [address] [facility] [address]  [facility] [address] [phone]  Schedule an appointment as soon as possible for a visit            Medication List      No changes were made to your prescriptions during this visit.          Tj Aleman MD  01/03/23 1537       Tj Aleman MD  01/03/23 1535

## 2023-01-03 NOTE — Clinical Note
Psychiatric EMERGENCY DEPARTMENT  [address] [facility] [address]  Phone: [phone]    Padma Arora was seen and treated in our emergency department on 1/3/2023.  She may return to work on 01/04/2023.         Thank you for choosing [facility].    Tj Aleman MD

## 2023-01-18 ENCOUNTER — OFFICE VISIT (OUTPATIENT)
Dept: CARDIOLOGY | Facility: HOSPITAL | Age: 43
End: 2023-01-18
Payer: COMMERCIAL

## 2023-01-18 VITALS
DIASTOLIC BLOOD PRESSURE: 86 MMHG | SYSTOLIC BLOOD PRESSURE: 138 MMHG | BODY MASS INDEX: 31.39 KG/M2 | TEMPERATURE: 96.8 F | HEIGHT: 67 IN | HEART RATE: 79 BPM | RESPIRATION RATE: 16 BRPM | OXYGEN SATURATION: 97 % | WEIGHT: 200 LBS

## 2023-01-18 DIAGNOSIS — R06.09 DOE (DYSPNEA ON EXERTION): ICD-10-CM

## 2023-01-18 DIAGNOSIS — R07.89 OTHER CHEST PAIN: Primary | ICD-10-CM

## 2023-01-18 DIAGNOSIS — R53.83 OTHER FATIGUE: ICD-10-CM

## 2023-01-18 PROCEDURE — 99203 OFFICE O/P NEW LOW 30 MIN: CPT | Performed by: NURSE PRACTITIONER

## 2023-01-18 RX ORDER — DEXTROAMPHETAMINE SULFATE, DEXTROAMPHETAMINE SACCHARATE, AMPHETAMINE SULFATE AND AMPHETAMINE ASPARTATE 6.25; 6.25; 6.25; 6.25 MG/1; MG/1; MG/1; MG/1
25 CAPSULE, EXTENDED RELEASE ORAL DAILY
COMMUNITY
Start: 2022-12-15

## 2023-01-18 RX ORDER — HYDROXYZINE HYDROCHLORIDE 10 MG/1
10 TABLET, FILM COATED ORAL NIGHTLY PRN
COMMUNITY

## 2023-01-19 NOTE — PROGRESS NOTES
"Chief Complaint  Establish Care and Chest Pain    Subjective    History of Present Illness {CC  Problem List  Visit  Diagnosis   Encounters  Notes  Medications  Labs  Result Review Imaging  Media :23}       History of Present Illness   42-year-old female presents the office today at the request of [facility] ED for ongoing evaluation for chest pain.  She reports she has experienced chest pain in the past and she contributed to anxiety or panic.  She reports most recently that chest pain felt differently and she was concerned prompting an ED visit.  EKGs in the ER showed normal sinus rhythm.  Does report dyspnea on exertion but notes she has not been exercising.  She notes that she has resumed weight watchers and is hoping to lose some weight following weight watchers.  History of brother with MI and hypertrophic cardiomyopathy, data deficient.  Patient has a history of hyperlipidemia,  mitral valve prolapse.  Currently denies palpitations, presyncope, syncope, orthopnea, PND or pedal edema  Objective     Vital Signs:   Vitals:    01/18/23 1519 01/18/23 1521 01/18/23 1522   BP: 131/83 139/83 138/86   BP Location: Right arm Left arm Left arm   Patient Position: Sitting Sitting Standing   Cuff Size: Adult Adult Adult   Pulse: 81 81 79   Resp: 16     Temp: 96.8 °F (36 °C)     TempSrc: Temporal     SpO2: 97%     Weight: 90.7 kg (200 lb)     Height: 170.2 cm (67\")       Body mass index is 31.32 kg/m².  Physical Exam  Vitals and nursing note reviewed.   Constitutional:       Appearance: Normal appearance.   HENT:      Head: Normocephalic.   Eyes:      Pupils: Pupils are equal, round, and reactive to light.   Cardiovascular:      Rate and Rhythm: Normal rate and regular rhythm.      Pulses: Normal pulses.      Heart sounds: Normal heart sounds. No murmur heard.  Pulmonary:      Effort: Pulmonary effort is normal.      Breath sounds: Normal breath sounds.   Abdominal:      General: Bowel sounds are " normal.      Palpations: Abdomen is soft.   Musculoskeletal:         General: Normal range of motion.      Cervical back: Normal range of motion.      Right lower leg: No edema.      Left lower leg: No edema.   Skin:     General: Skin is warm and dry.      Capillary Refill: Capillary refill takes less than 2 seconds.   Neurological:      Mental Status: She is alert and oriented to person, place, and time.   Psychiatric:         Mood and Affect: Mood normal.         Thought Content: Thought content normal.              Result Review  Data Reviewed:{ Labs  Result Review  Imaging  Med Tab  Media :23}     Lab Results   Component Value Date    GLUCOSE 101 (H) 01/03/2023    CALCIUM 9.6 01/03/2023     01/03/2023    K 3.7 01/03/2023    CO2 24.0 01/03/2023     01/03/2023    BUN 13 01/03/2023    CREATININE 0.83 01/03/2023    EGFRIFNONA 69 05/06/2021    BCR 15.7 01/03/2023    ANIONGAP 12.0 01/03/2023     Lab Results   Component Value Date    WBC 16.72 (H) 01/03/2023    HGB 12.5 01/03/2023    HCT 38.6 01/03/2023    MCV 83.2 01/03/2023     01/03/2023     ECG 12 Lead ED Triage Standing Order; Chest Pain (01/03/2023 11:46)  ECG 12 Lead ED Triage Standing Order; Chest Pain (01/03/2023 14:15)             Assessment and Plan {CC Problem List  Visit Diagnosis  ROS  Review (Popup)  Health Maintenance  Quality  BestPractice  Medications  SmartSets  SnapShot Encounters  Media :23}   1. Other chest pain  javier score 0  - Treadmill Stress Test; Future    2. KIM (dyspnea on exertion)    - Treadmill Stress Test; Future    3. Other fatigue    - Treadmill Stress Test; Future        Follow Up {Instructions Charge Capture  Follow-up Communications :23}   Return if symptoms worsen or fail to improve.    Patient was given instructions and counseling regarding her condition or for health maintenance advice. Please see specific information pulled into the AVS if appropriate.  Patient was instructed to call the  [facility] with any questions, concerns, or worsening symptoms.

## 2023-01-30 ENCOUNTER — HOSPITAL ENCOUNTER (OUTPATIENT)
Dept: CARDIOLOGY | Facility: HOSPITAL | Age: 43
Discharge: HOME | End: 2023-01-30
Admitting: NURSE PRACTITIONER
Payer: COMMERCIAL

## 2023-01-30 VITALS
SYSTOLIC BLOOD PRESSURE: 140 MMHG | DIASTOLIC BLOOD PRESSURE: 80 MMHG | BODY MASS INDEX: 31.38 KG/M2 | HEIGHT: 67 IN | WEIGHT: 199.96 LBS | HEART RATE: 81 BPM

## 2023-01-30 DIAGNOSIS — R53.83 OTHER FATIGUE: ICD-10-CM

## 2023-01-30 DIAGNOSIS — R07.89 OTHER CHEST PAIN: ICD-10-CM

## 2023-01-30 DIAGNOSIS — R06.09 DOE (DYSPNEA ON EXERTION): ICD-10-CM

## 2023-01-30 LAB
BH CV STRESS BP STAGE 1: NORMAL
BH CV STRESS BP STAGE 3: NORMAL
BH CV STRESS DURATION MIN STAGE 1: 3
BH CV STRESS DURATION MIN STAGE 2: 3
BH CV STRESS DURATION MIN STAGE 3: 3
BH CV STRESS DURATION MIN STAGE 4: 0
BH CV STRESS DURATION SEC STAGE 1: 0
BH CV STRESS DURATION SEC STAGE 2: 0
BH CV STRESS DURATION SEC STAGE 3: 0
BH CV STRESS DURATION SEC STAGE 4: 13
BH CV STRESS GRADE STAGE 1: 10
BH CV STRESS GRADE STAGE 2: 12
BH CV STRESS GRADE STAGE 3: 14
BH CV STRESS GRADE STAGE 4: 16
BH CV STRESS HR STAGE 1: 102
BH CV STRESS HR STAGE 2: 117
BH CV STRESS HR STAGE 3: 142
BH CV STRESS HR STAGE 4: 162
BH CV STRESS METS STAGE 1: 5
BH CV STRESS METS STAGE 2: 7.5
BH CV STRESS METS STAGE 3: 10
BH CV STRESS METS STAGE 4: 13.5
BH CV STRESS O2 STAGE 1: 95
BH CV STRESS O2 STAGE 2: 95
BH CV STRESS O2 STAGE 3: 95
BH CV STRESS O2 STAGE 4: 95
BH CV STRESS PROTOCOL 1: NORMAL
BH CV STRESS RECOVERY BP: NORMAL MMHG
BH CV STRESS RECOVERY HR: 88 BPM
BH CV STRESS RECOVERY O2: 97 %
BH CV STRESS SPEED STAGE 1: 1.7
BH CV STRESS SPEED STAGE 2: 2.5
BH CV STRESS SPEED STAGE 3: 3.4
BH CV STRESS SPEED STAGE 4: 4.2
BH CV STRESS STAGE 1: 1
BH CV STRESS STAGE 2: 2
BH CV STRESS STAGE 3: 3
BH CV STRESS STAGE 4: 4
MAXIMAL PREDICTED HEART RATE: 178 BPM
PERCENT MAX PREDICTED HR: 91.01 %
STRESS BASELINE BP: NORMAL MMHG
STRESS BASELINE HR: 76 BPM
STRESS O2 SAT REST: 96 %
STRESS PERCENT HR: 107 %
STRESS POST ESTIMATED WORKLOAD: 10.4 METS
STRESS POST EXERCISE DUR MIN: 9 MIN
STRESS POST EXERCISE DUR SEC: 13 SEC
STRESS POST O2 SAT PEAK: 95 %
STRESS POST PEAK BP: NORMAL MMHG
STRESS POST PEAK HR: 162 BPM
STRESS TARGET HR: 151 BPM

## 2023-01-30 PROCEDURE — 93017 CV STRESS TEST TRACING ONLY: CPT

## 2023-01-30 PROCEDURE — 93018 CV STRESS TEST I&R ONLY: CPT | Performed by: INTERNAL MEDICINE

## 2023-02-24 ENCOUNTER — TELEPHONE (OUTPATIENT)
Dept: MRI IMAGING | Facility: HOSPITAL | Age: 43
End: 2023-02-24
Payer: COMMERCIAL

## 2023-02-24 NOTE — TELEPHONE ENCOUNTER
Spoke with patient and going to get MMG in June 2023 first and get back on 6 month rotation for screening MRI.

## 2023-03-08 ENCOUNTER — TRANSCRIBE ORDERS (OUTPATIENT)
Dept: ADMINISTRATIVE | Facility: HOSPITAL | Age: 43
End: 2023-03-08
Payer: COMMERCIAL

## 2023-03-08 DIAGNOSIS — R92.8 ABNORMAL FINDING ON BREAST IMAGING: Primary | ICD-10-CM

## 2023-06-06 ENCOUNTER — HOSPITAL ENCOUNTER (OUTPATIENT)
Dept: MAMMOGRAPHY | Facility: HOSPITAL | Age: 43
Discharge: HOME | End: 2023-06-06
Admitting: ADVANCED PRACTICE MIDWIFE
Payer: COMMERCIAL

## 2023-06-06 DIAGNOSIS — R92.8 ABNORMAL FINDING ON BREAST IMAGING: ICD-10-CM

## 2023-06-06 PROCEDURE — 77066 DX MAMMO INCL CAD BI: CPT

## 2023-06-06 PROCEDURE — G0279 TOMOSYNTHESIS, MAMMO: HCPCS

## 2023-08-02 ENCOUNTER — HOSPITAL ENCOUNTER (OUTPATIENT)
Dept: MRI IMAGING | Facility: HOSPITAL | Age: 43
Discharge: HOME | End: 2023-08-02
Admitting: ADVANCED PRACTICE MIDWIFE
Payer: COMMERCIAL

## 2023-08-02 DIAGNOSIS — R92.8 ABNORMAL FINDINGS ON DIAGNOSTIC IMAGING OF BREAST: ICD-10-CM

## 2023-08-02 DIAGNOSIS — Z80.3 FAMILY HISTORY OF MALIGNANT NEOPLASM OF BREAST: ICD-10-CM

## 2023-08-02 PROCEDURE — A9577 INJ MULTIHANCE: HCPCS | Performed by: ADVANCED PRACTICE MIDWIFE

## 2023-08-02 PROCEDURE — 77049 MRI BREAST C-+ W/CAD BI: CPT

## 2023-08-02 PROCEDURE — 0 GADOBENATE DIMEGLUMINE 529 MG/ML SOLUTION: Performed by: ADVANCED PRACTICE MIDWIFE

## 2023-08-02 RX ADMIN — GADOBENATE DIMEGLUMINE 18 ML: 529 INJECTION, SOLUTION INTRAVENOUS at 13:08

## 2024-05-06 ENCOUNTER — TELEPHONE (OUTPATIENT)
Dept: GENETICS | Facility: HOSPITAL | Age: 44
End: 2024-05-06
Payer: COMMERCIAL

## 2024-05-10 ENCOUNTER — CLINICAL SUPPORT (OUTPATIENT)
Dept: GENETICS | Facility: HOSPITAL | Age: 44
End: 2024-05-10
Payer: COMMERCIAL

## 2024-05-10 ENCOUNTER — LAB (OUTPATIENT)
Dept: LAB | Facility: HOSPITAL | Age: 44
End: 2024-05-10
Payer: COMMERCIAL

## 2024-05-10 DIAGNOSIS — Z80.42 FAMILY HISTORY OF PROSTATE CANCER: ICD-10-CM

## 2024-05-10 DIAGNOSIS — Z13.79 GENETIC TESTING: Primary | ICD-10-CM

## 2024-05-10 DIAGNOSIS — Z80.3 FAMILY HISTORY OF MALIGNANT NEOPLASM OF BREAST: ICD-10-CM

## 2024-05-10 PROCEDURE — 96040: CPT

## 2024-05-28 ENCOUNTER — DOCUMENTATION (OUTPATIENT)
Dept: GENETICS | Facility: HOSPITAL | Age: 44
End: 2024-05-28
Payer: COMMERCIAL

## 2024-05-28 NOTE — PROGRESS NOTES
Date of Visit: 2024  Name: Padma Arora  : 1980  MRN: [id number]    Referring Provider: Laura Rose APRN       REASON FOR CONSULT  Padma Arora is a 43 y.o. female referred for genetic counseling due to a family history of breast cancer.  Ms. Arora was interested in discussing her risk for a hereditary cancer syndrome and genetic testing for cancer susceptibility.  Ms. Arora does not have a personal history of cancer.  She reached menarche at age 14 and had her first child at age 29.  She is premenopausal and has never used HRT.  Her most recent mammogram described her breast tissue as heterogeneously dense. Ms. Arora elected to pursue genetic testing via [facility] panel with [facility] analyzing 34-cancer risk genes.  Ms. Arora's genetic test result was NEGATIVE for pathogenic mutations in all 34 - cancer risk genes analyzed.  Her estimated lifetime breast cancer risk is 23.5% as calculated by the Tyrer-Cuzick model.  She is considered at an increased risk for breast cancer.  Ms. Arora was notified of these results via telephone on 2024.  Ms. Arora requested to be referred to the Cancer [facility] Management Clinic at [facility] to discuss her breast cancer screening strategy.     PERTINENT FAMILY HISTORY:  A cancer-focused four-generation pedigree was obtained via patient reporting     Mother - melanoma, 68  Maternal Aunt 1, breast cancer, 57  Maternal Aunt 2, breast cancer, 60  Maternal Aunt 3, breast cancer, 65  Maternal Grandfather - prostate cancer, 80's  Maternal Great Grandmother (mother of MGM) - breast cancer, 80's  Paternal Great Grandmother (mother of PGM) - breast cancer, 62    GENETIC TESTING RESULTS AND RECOMMENDATIONS  Genetic testing was performed by [facility] laboratory analyzing 34-cancer-risk genes.    Genetic testing was negative for pathogenic mutations by sequencing, rearrangement testing, and RNA analysis for the 34  genes on the CancerNext panel.  This result does not clarify the cause of Ms. Arora's family history of cancer.  A negative result does not absolutely rule out a hereditary cause for Ms. Arora's family history of cancer.  There could be a mutation in the family that she did not inherit.  There could be a mutation in the family that explains the pattern of cancer that we cannot identify at this time or in a gene that was not tested.  There could be a mutation in one of the genes tested that was not detected. Current genetic testing technologies will identify the majority of mutations with high accuracy, but some remain undetectable at this time.  Ms. Oneals family history of cancer could be occurring not due to a single gene mutation, but due to multiple undetectable genetic and environmental factors.  We encourage patients to reach out over time to inquire about any new methodologies of testing or newly identified cancer risk genes.    Increased cancer risks for Ms. Arora and her family may remain due to her family history of cancer.  The Tyrer-Cuzick model (SAÚL) is a validated female breast cancer risk tool that assesses an unaffected (has not had breast cancer) female's lifetime breast cancer risk.  Tyrer-Cuzick version 8.0b was used to calculate Ms. Oneals lifetime breast cancer risk.  Ms. Arora's estimated lifetime breast cancer risk is 23.5%.  This model predicts the average 43 y.o. unaffected female's lifetime risk of breast cancer to be 10.5%.  The patient's lifetime breast cancer risk is an estimate based on available information and may change over time. It may increase or decrease based on age and any changes to their personal and/or family medical history.  The National Comprehensive Cancer Network guidelines (version 2.2024) recommend the following for 43 y.o. females who have a greater than 20% lifetime risk of breast cancer calculated from validated models such as the Tyrer-Cuzick model:      Annual clinical breast exam every 6-12 months and consider a referral to a breast specialist as appropriate  Annual screening mammogram with tomosynthesis  Annual breast MRI with and without contrast   Consider contrast-enhanced mammography (SAMI) or molecular breast imaging (MBI) for those that qualify for, but cannot undergo MRI  Whole breast ultrasound may be done if contrast-enhanced imaging or functional imaging is not available/accessible  Consider risk reduction strategies such as oral chemoprevention or, if desired by the patient, potentially more invasive/surgical interventions  The pros and cons of any risk reducing strategy should be discussed with the appropriate provider before the intervention's implementation  Practice breast awareness     GENETIC COUNSELING  We reviewed the family history and her personal history information in detail. Cases of cancer follow three general patterns: sporadic, familial, and hereditary.  While most cancer cases are sporadic (~70% of cancer cases), some cases appear to occur in family clusters.  These cases are said to be familial and account for around 20% of cancer cases.  Familial cases may be due to a combination of shared genes and environmental factors among family members that we cannot evaluate via genetic testing at this time.  In 5% - 10% of cancer cases, the risk for cancer is inherited, and the genes responsible for the increased cancer risk are known.       Family histories typical of hereditary cancer syndromes usually include multiple first- and second-degree relatives diagnosed with cancer types that define a syndrome.  These cases tend to be diagnosed at younger-than-expected ages and can be bilateral or multifocal.  The cancer in these families follows an autosomal dominant inheritance pattern, which indicates the likely presence of a mutation in a cancer gene.  Children and siblings of an individual carrying a mutation have a 50% chance of  "inheriting that mutation, thereby inheriting the increased risk to develop cancer.  However, it is not recommended to pursue genetic testing for adult-onset cancers in children as the results would not have clinical utility until some time in adulthood among other ethical reasons.  These mutations can be passed down from the maternal or the paternal lineage.     We discussed that risk factors or \"red flags\" for hereditary risk include cancers diagnosed at earlier-than-average ages, multiple family members diagnosed with cancers associated with mutations in the same gene, or multiple generations of associated cancers. Dependent on the specific cancer type or syndrome, there exists the potential for several clinically significant genes related to the cancer's onset or increased risk for development.  Therefore, the standard approach to hereditary cancer genetic testing at this time is via multi-gene panel analyzing several genes associated with a hereditary risk for cancer.  We reviewed the results in the context of the patient's personal and family history to determine what, if any, post-test cancer risk management changes are recommended.     GENETIC TESTING  The risks, benefits, and limitations of genetic testing and implications for clinical management following testing were reviewed.  Genetic test results can influence decisions regarding screening and prevention.  Genetic testing can have significant psychological implications for both individuals and families. Also discussed was the possibility of insurance discrimination based on genetic test results and the laws in place to prevent this, as well as the limitations of these laws.       The Genetic Information Nondiscrimination Act (ELIZ) is a federal law enacted in May 2008.  ELIZ prohibits discrimination on the basis of genetic information such as genetic test results with respect to health insurance and employment status.  Under Title 1 of ELIZ, health " "insurance companies are prohibited from using an individual's genetic information to change premiums, drop or change an individual's coverage, or prevent coverage from being acquired.  Title 2 of ELIZ prohibits employers from using genetic information in employment decisions such as, but not limited to, hiring, firing, promotions, pay, and job assignments.  ELIZ protections do not protect against genetic discrimination by life insurance, long-term care or disability insurance,  service members, federal employees, those using the  Health Service, or those employed by a small business with fewer than 15 employees.     We discussed the implications and limitations of a positive, negative, or variant of uncertain significance (VUS) test result.  Variants are termed \"VUS\" when there is not sufficient evidence to classify the variant as a negative (not harmful) variant or a positive (harmful) mutation. Genetic testing labs work to reclassify all VUSs overtime and will issue an updated report when a reclassification occurs.  The majority (over 90%) of VUSs that are reclassified are found to be negative genetic variants, however a small percentage will be upgraded to a harmful mutation. Clinically, a VUS is treated as a negative result.  If genetic testing is negative or a variant of uncertain significance (VUS) is found, management should be guided by a family history-based risk assessment.  Medical care should not be altered based on a VUS result.  Genetic testing for other unaffected (never had cancer) relatives is not recommended for a VUS.     FAMILY CONSIDERATIONS  Genetic testing for Ms. Arora's relatives may be informative despite Ms. Arora's negative test result.  Her maternal family history consists of several breast and prostate cancer diagnoses, which is suggestive of a hereditary cancer-causing mutation.  The possibility remains that a hereditary cancer-causing mutation exists in the family, but " Ms. Arora did not inherit it.  Therefore, her siblings and maternal relatives would be appropriate candidates for genetic counseling and testing.  Testing unaffected family members may result in negative results as well or VUS's that would not clarify Ms. Arora's family history of cancer.  It would be uninformative to test Ms. Arora's children as she cannot pass on mutations that she does not have.     Family members are encouraged to discuss their family history of cancer and appropriate cancer screening recommendations with their healthcare providers.  Family members are also encouraged to inquire about information regarding genetics and genetic testing, if appropriate, at a clinic that offers genetic counseling or their healthcare provider.  We would be happy to see relatives in our clinic for genetic counseling and testing.  They can request a referral from their healthcare provider to [facility] and that will prompt a coordinator to call them for an appointment.   For family members who live elsewhere, there are genetic counselors at most [facility].  They can find a genetic counselor by visiting the National Society of Genetic Counselors website at www.nsgc.org or they can call our office and we would be happy to give them the contact information of the closest genetic counselor.      SUMMARY  Ms. Arora's hereditary cancer genetic test identified no pathogenic mutations explaining her family history of cancer, nor increasing her personal cancer risk.  Her estimated lifetime breast cancer risk is 23.5% as calculated by the Tyrer-Cuzick model.  She is considered to be at an increased risk for breast cancer, and her breast cancer screening strategy should be guided by the previously described recommendations.  As requested by Ms. Arora, a referral to the Cancer Risk Management Clinic at [facility] will be placed to possibly manage her breast cancer screening  strategy.  A copy of her genetic test report is attached to this note.  Ms. Arora asked excellent questions during the consult and did not demonstrate any acute psychosocial distress during our visit.  This clinic encounter was 20 minutes in duration.      Zion Clemente MS  Genetic Counselor  [facility]    Cc:  Laura Arias, Susan Oliva MD

## 2024-06-07 ENCOUNTER — HOSPITAL ENCOUNTER (OUTPATIENT)
Dept: MAMMOGRAPHY | Facility: HOSPITAL | Age: 44
Discharge: HOME | End: 2024-06-07
Payer: COMMERCIAL

## 2024-06-07 ENCOUNTER — HOSPITAL ENCOUNTER (OUTPATIENT)
Dept: ULTRASOUND IMAGING | Facility: HOSPITAL | Age: 44
Discharge: HOME | End: 2024-06-07
Payer: COMMERCIAL

## 2024-06-07 ENCOUNTER — TRANSCRIBE ORDERS (OUTPATIENT)
Dept: ADMINISTRATIVE | Facility: HOSPITAL | Age: 44
End: 2024-06-07
Payer: COMMERCIAL

## 2024-06-07 DIAGNOSIS — R92.8 ABNORMAL MAMMOGRAM: ICD-10-CM

## 2024-06-07 DIAGNOSIS — R92.8 ABNORMAL MAMMOGRAM: Primary | ICD-10-CM

## 2024-06-07 PROCEDURE — 76642 ULTRASOUND BREAST LIMITED: CPT

## 2024-06-07 PROCEDURE — 77066 DX MAMMO INCL CAD BI: CPT

## 2024-06-07 PROCEDURE — G0279 TOMOSYNTHESIS, MAMMO: HCPCS

## 2024-06-24 ENCOUNTER — OFFICE VISIT (OUTPATIENT)
Dept: RADIATION ONCOLOGY | Facility: HOSPITAL | Age: 44
End: 2024-06-24
Payer: COMMERCIAL

## 2024-06-24 VITALS
WEIGHT: 211.5 LBS | SYSTOLIC BLOOD PRESSURE: 142 MMHG | TEMPERATURE: 98.5 F | BODY MASS INDEX: 33.19 KG/M2 | HEART RATE: 79 BPM | DIASTOLIC BLOOD PRESSURE: 82 MMHG | OXYGEN SATURATION: 98 % | HEIGHT: 67 IN | RESPIRATION RATE: 18 BRPM

## 2024-06-24 DIAGNOSIS — Z91.89 AT HIGH RISK FOR BREAST CANCER: Primary | ICD-10-CM

## 2024-06-24 DIAGNOSIS — Z80.3 FAMILY HISTORY OF BREAST CANCER IN FEMALE: ICD-10-CM

## 2024-06-24 RX ORDER — MULTIPLE VITAMINS W/ MINERALS TAB 9MG-400MCG
1 TAB ORAL DAILY
COMMUNITY

## 2024-06-24 RX ORDER — BUPROPION HYDROCHLORIDE 300 MG/1
300 TABLET ORAL EVERY MORNING
COMMUNITY

## 2024-06-24 RX ORDER — ERGOCALCIFEROL 1.25 MG/1
50000 CAPSULE ORAL
COMMUNITY
Start: 2024-05-30

## 2024-06-24 RX ORDER — PREDNISONE 5 MG/1
5 TABLET ORAL 3 TIMES DAILY
COMMUNITY
Start: 2024-06-21

## 2024-06-24 NOTE — PROGRESS NOTES
CANCER RISK MANAGEMENT CLINIC NEW PATIENT    Padma Arora  [id number]  1980    Subjective   Chief Complaint: At High Risk for breast cancer      HISTORY OF PRESENT ILLNESS:       Padma Arora is a 44 y.o. year old female here today for her initial high risk visit.  She was referred to the Breast Cancer Risk Management Clinic from genetic counseling based on her elevated lifetime breast cancer risk. She underwent genetic testing May 2024 which was negative for pathogenic mutations by sequencing, rearrangement testing, and RNA analysis for the 34 genes on the CancerNext panel. Based on her significant family history on breast cancer on her maternal side, the [employer] version 8.0b was used to calculate Ms. Arora's lifetime breast cancer risk.  Her estimated lifetime breast cancer risk is 23.5%.  This model predicts the average 43 y.o. unaffected female's lifetime risk of breast cancer to be 10.5%.  The patient's lifetime breast cancer risk is an estimate based on available information and may change over time.     Patient reports her first ever mammogram was at the age of 33 due to a breast concern but resulted as benign.  He started her annual mammograms at the age of 40 and reports compliance.  Her first of her breast MRI was in 2021 when the breast imaging center told her that she was high risk.  She underwent right breast MRI guided biopsy 7/24/2022 which resulted as PASH and stromal fibrosis, benign.  Her breast MRI 8/2024 was benign, BI-RADS 2.  Her annual mammogram this year on 6/7/2024 was abnormal due to more prominent oval mass lateral and superior to her previous MRI guided biopsy marker, BI-RADS 4.  She is scheduled to undergo right breast biopsy on 7/1/2024.  No current palpable breast concerns.  Patient reports she is overall good health.  Currently undergoing rheumatoid arthritis workup.  On oral prednisone for suspected flare.    Pertinent Family History:  Mother - melanoma,  68  Maternal Aunt 1, breast cancer, 57  Maternal Aunt 2, breast cancer, 60  Maternal Aunt 3, breast cancer, 65  Maternal Grandfather - prostate cancer, 80's  Maternal Great Grandmother (mother of MGM) - breast cancer, 80's  Paternal Great Grandmother (mother of PGM) - breast cancer, 62      The current medication list and allergy list were reviewed and reconciled.     Past Medical History:   Diagnosis Date    Anxiety and depression     Gout     Hyperlipidemia 2019    Total - 216    Mitral valve prolapse 22    Trace regurgitation       Past Surgical History:   Procedure Laterality Date    BREAST BIOPSY Right 2022    Benign- PASH + Stromal fibrosis    WISDOM TOOTH EXTRACTION         Family History   Problem Relation Age of Onset    Breast cancer Maternal Aunt 64    Arrhythmia Maternal Aunt         Heart ablation supraventricular tachycardia    Breast cancer Paternal Aunt 54    Breast cancer Maternal Great-Grandmother 80    Breast cancer Paternal Great-Grandmother 62    Breast cancer Paternal Grandmother         60's    Heart attack Paternal Grandmother          as a result    Breast cancer Maternal Aunt 58    Breast cancer Maternal Aunt 60    Hyperlipidemia Mother     Hyperlipidemia Father     Heart attack Maternal Grandfather     Heart disease Maternal Grandfather         Blockage in ventricle    Heart failure Maternal Grandmother         Congestive heart failure    Hyperlipidemia Maternal Grandmother     Hypertension Maternal Grandmother     Heart failure Paternal Grandfather         Congestive heart failure    Hyperlipidemia Paternal Grandfather     Hypertension Paternal Grandfather     Heart attack Brother         41 yo, still living    Heart disease Brother         Hypertrophic Cardiomyopathy, age 38, ventrical valve issue    Heart disease Brother         Thickened ventricle    Hyperlipidemia Paternal Aunt     Hypertension Paternal Aunt     Hyperlipidemia Paternal Uncle     Hypertension  "Paternal Uncle     Ovarian cancer Neg Hx        Reproductive:    Age of first live birth: 29 years  Age of first menstrual period: 14 years  Pre/anayeli/postmenopause:Pre  HRT use:Denies  Contraception use: Currently has Mirena IUD in place. Previous use of OCP for 16 years. Has had IUDs for past 12 years.    Health Maintenance:    Regular self breast exam: yes  Mammogram: 2024. History of abnormal mammogram: yes - abnormal, see HPI. Scheduled US guided breast biopsy 2024  Breast MRI: 2023. Results: negative    Risk Asessment: Tyrer-Cuzick: 23.5%      Review of Systems   Constitutional:  Positive for fatigue.        Patient currently undergoing work up for RA   HENT: Negative.     Eyes: Negative.    Respiratory: Negative.     Cardiovascular: Negative.    Gastrointestinal: Negative.    Endocrine: Negative.    Genitourinary: Negative.    Musculoskeletal: Negative.    Skin: Negative.    Allergic/Immunologic: Positive for environmental allergies.   Neurological:  Positive for dizziness and headache.        Since possible RA flare, undergoing work up   Hematological: Negative.    Psychiatric/Behavioral:  Positive for sleep disturbance, depressed mood and stress.         Related to upcoming breast biopsy         Objective   Physical Exam  Vital Signs: /82 Comment: currently on prednisone by Rheumatologist  Pulse 79   Temp 98.5 °F (36.9 °C) (Skin)   Resp 18   Ht 170.2 cm (67\")   Wt 95.9 kg (211 lb 8 oz)   SpO2 98%   BMI 33.13 kg/m²   Vitals:    24 1051   PainSc: 0-No pain           General Appearance:  alert, cooperative, no apparent distress and appears stated age   Neurologic/Psych: A&O x 3, gait steady, appropriate affect   Breasts:  Symmetrical, no masses, no lesions, and no nipple discharge   Abdomen:   Soft, non-tender, and non-distended   Lymph nodes: No cervical, supraclavicular, inguinal or axillary adenopathy noted   Skin: No rashes, ulcers, or suspicious lesions noted   Pelvic: " deferred       Result Review :              Assessment and Plan:    Diagnoses and all orders for this visit:    1. At high risk for breast cancer (Primary)    2. Family history of breast cancer in female        Patient Education:  Reviewed screening schedule related to diagnosis as follows:  Monthly breast self-exam starting at age 18.  Educated patient on importance of monthly self breast exams.  Directed her towards resources to learn how to do a good monthly self breast exam.  Clinical breast exam every 6 months.  The patient will alternate between cancer risk management clinic and her OB/GYN for clinical breast exams every 6 months.  Annual mammogram starting at age 40, or 10 years prior to the earliest diagnosis in the family.  Patient has initiated annual screening mammography at the age of 40.  Her most recent screening mammogram 6/2024 was abnormal, BI-RADS 4 recommending right breast biopsy.  She is scheduled to undergo a right breast biopsy on 7/1/2024 at [facility]'s imaging center.  Placed order for patient's annual screening mammogram for next year, 6/2025.  Annual MRI starting at age 40, or 10 years prior to the earliest diagnosis in the family.  Patient has been undergoing annual screening breast MRI since 2021.  Patient's last breast MRI 8/2023 was benign, BI-RADS 2.  Ordered annual screening breast MRI this year for 8/2024.  Consider chemoprevention for breast cancer risk reduction (tamoxifen/evista).  Discussed risks/benefits with patient.  She would like to speak with her mother who is on chemoprevention for her experiences and do research.  She will contact me if she decides to pursue.  Patient verbalizes understanding with above plan.  She will contact the cancer risk management clinic with any further questions or concerns.      Pain assessment was performed today as a part of patient’s care. For patients with pain related to surgery, gynecologic malignancy or cancer treatment, the  plan is as noted in the assessment/plan.  For patients with pain not related to these issues, they are to seek any further needed care from a more appropriate provider, such as PCP.      I spent 60 minutes caring for Padma on this date of service. This time includes time spent by me in the following activities: preparing for the visit, reviewing tests, obtaining and/or reviewing a separately obtained history, performing a medically appropriate examination and/or evaluation, counseling and educating the patient/family/caregiver, ordering medications, tests, or procedures, and documenting information in the medical record.           Return to clinic in 1 year for High Risk visit.      Electronically signed by VIJAY Gutierrez on 06/24/24 at 12:56 EDT

## 2024-06-24 NOTE — LETTER
June 24, 2024       No Recipients    Patient: Padma Arora   YOB: 1980   Date of Visit: 6/24/2024     Dear Av Zimmerman MD:       Thank you for referring Padma Arora to me for evaluation. Below are the relevant portions of my assessment and plan of care.    If you have questions, please do not hesitate to call me. I look forward to following Padma along with you.         Sincerely,        VIJAY Gutierrez        CC:   No Recipients    Li Sun APRN  06/24/24 1305  Sign when Signing Visit  CANCER RISK MANAGEMENT CLINIC NEW PATIENT    Padma Arora  [id number]  1980    Subjective  Chief Complaint: At High Risk for breast cancer      HISTORY OF PRESENT ILLNESS:       Padma Arora is a 44 y.o. year old female here today for her initial high risk visit.  She was referred to the Breast Cancer Risk Management Clinic from genetic counseling based on her elevated lifetime breast cancer risk. She underwent genetic testing May 2024 which was negative for pathogenic mutations by sequencing, rearrangement testing, and RNA analysis for the 34 genes on the CancerNext panel. Based on her significant family history on breast cancer on her maternal side, the Tyrer-Cuzick version 8.0b was used to calculate Ms. Arora's lifetime breast cancer risk.  Her estimated lifetime breast cancer risk is 23.5%.  This model predicts the average 43 y.o. unaffected female's lifetime risk of breast cancer to be 10.5%.  The patient's lifetime breast cancer risk is an estimate based on available information and may change over time.     Patient reports her first ever mammogram was at the age of 33 due to a breast concern but resulted as benign.  He started her annual mammograms at the age of 40 and reports compliance.  Her first of her breast MRI was in 2021 when the breast imaging center told her that she was high risk.  She underwent right breast MRI guided biopsy 7/24/2022 which  resulted as PASH and stromal fibrosis, benign.  Her breast MRI 2024 was benign, BI-RADS 2.  Her annual mammogram this year on 2024 was abnormal due to more prominent oval mass lateral and superior to her previous MRI guided biopsy marker, BI-RADS 4.  She is scheduled to undergo right breast biopsy on 2024.  No current palpable breast concerns.  Patient reports she is overall good health.  Currently undergoing rheumatoid arthritis workup.  On oral prednisone for suspected flare.    Pertinent Family History:  Mother - melanoma, 68  Maternal Aunt 1, breast cancer, 57  Maternal Aunt 2, breast cancer, 60  Maternal Aunt 3, breast cancer, 65  Maternal Grandfather - prostate cancer, 80's  Maternal Great Grandmother (mother of MGM) - breast cancer, 80's  Paternal Great Grandmother (mother of PGM) - breast cancer, 62      The current medication list and allergy list were reviewed and reconciled.     Past Medical History:   Diagnosis Date   • Anxiety and depression    • Gout    • Hyperlipidemia 2019    Total - 216   • Mitral valve prolapse 22    Trace regurgitation       Past Surgical History:   Procedure Laterality Date   • BREAST BIOPSY Right 2022    Benign- PASH + Stromal fibrosis   • WISDOM TOOTH EXTRACTION         Family History   Problem Relation Age of Onset   • Breast cancer Maternal Aunt 64   • Arrhythmia Maternal Aunt         Heart ablation supraventricular tachycardia   • Breast cancer Paternal Aunt 54   • Breast cancer Maternal Great-Grandmother 80   • Breast cancer Paternal Great-Grandmother 62   • Breast cancer Paternal Grandmother         60's   • Heart attack Paternal Grandmother          as a result   • Breast cancer Maternal Aunt 58   • Breast cancer Maternal Aunt 60   • Hyperlipidemia Mother    • Hyperlipidemia Father    • Heart attack Maternal Grandfather    • Heart disease Maternal Grandfather         Blockage in ventricle   • Heart failure Maternal Grandmother          Congestive heart failure   • Hyperlipidemia Maternal Grandmother    • Hypertension Maternal Grandmother    • Heart failure Paternal Grandfather         Congestive heart failure   • Hyperlipidemia Paternal Grandfather    • Hypertension Paternal Grandfather    • Heart attack Brother         43 yo, still living   • Heart disease Brother         Hypertrophic Cardiomyopathy, age 38, ventrical valve issue   • Heart disease Brother         Thickened ventricle   • Hyperlipidemia Paternal Aunt    • Hypertension Paternal Aunt    • Hyperlipidemia Paternal Uncle    • Hypertension Paternal Uncle    • Ovarian cancer Neg Hx        Reproductive:    Age of first live birth: 29 years  Age of first menstrual period: 14 years  Pre/anayeli/postmenopause:Pre  HRT use:Denies  Contraception use: Currently has Mirena IUD in place. Previous use of OCP for 16 years. Has had IUDs for past 12 years.    Health Maintenance:    Regular self breast exam: yes  Mammogram: 2024. History of abnormal mammogram: yes - abnormal, see HPI. Scheduled US guided breast biopsy 2024  Breast MRI: 2023. Results: negative    Risk Asessment: Tyrer-Cuzick: 23.5%      Review of Systems   Constitutional:  Positive for fatigue.        Patient currently undergoing work up for RA   HENT: Negative.     Eyes: Negative.    Respiratory: Negative.     Cardiovascular: Negative.    Gastrointestinal: Negative.    Endocrine: Negative.    Genitourinary: Negative.    Musculoskeletal: Negative.    Skin: Negative.    Allergic/Immunologic: Positive for environmental allergies.   Neurological:  Positive for dizziness and headache.        Since possible RA flare, undergoing work up   Hematological: Negative.    Psychiatric/Behavioral:  Positive for sleep disturbance, depressed mood and stress.         Related to upcoming breast biopsy         Objective  Physical Exam  Vital Signs: /82 Comment: currently on prednisone by Rheumatologist  Pulse 79   Temp 98.5 °F (36.9  "°C) (Skin)   Resp 18   Ht 170.2 cm (67\")   Wt 95.9 kg (211 lb 8 oz)   SpO2 98%   BMI 33.13 kg/m²   Vitals:    06/24/24 1051   PainSc: 0-No pain           General Appearance:  alert, cooperative, no apparent distress and appears stated age   Neurologic/Psych: A&O x 3, gait steady, appropriate affect   Breasts:  Symmetrical, no masses, no lesions, and no nipple discharge   Abdomen:   Soft, non-tender, and non-distended   Lymph nodes: No cervical, supraclavicular, inguinal or axillary adenopathy noted   Skin: No rashes, ulcers, or suspicious lesions noted   Pelvic: deferred       Result Review:              Assessment and Plan:    Diagnoses and all orders for this visit:    1. At high risk for breast cancer (Primary)    2. Family history of breast cancer in female        Patient Education:  Reviewed screening schedule related to diagnosis as follows:  Monthly breast self-exam starting at age 18.  Educated patient on importance of monthly self breast exams.  Directed her towards resources to learn how to do a good monthly self breast exam.  Clinical breast exam every 6 months.  The patient will alternate between cancer risk management clinic and her OB/GYN for clinical breast exams every 6 months.  Annual mammogram starting at age 40, or 10 years prior to the earliest diagnosis in the family.  Patient has initiated annual screening mammography at the age of 40.  Her most recent screening mammogram 6/2024 was abnormal, BI-RADS 4 recommending right breast biopsy.  She is scheduled to undergo a right breast biopsy on 7/1/2024 at [facility] women[facility] imaging center.  Placed order for patient's annual screening mammogram for next year, 6/2025.  Annual MRI starting at age 40, or 10 years prior to the earliest diagnosis in the family.  Patient has been undergoing annual screening breast MRI since 2021.  Patient's last breast MRI 8/2023 was benign, BI-RADS 2.  Ordered annual screening breast MRI this year for " 8/2024.  Consider chemoprevention for breast cancer risk reduction (tamoxifen/evista).  Discussed risks/benefits with patient.  She would like to speak with her mother who is on chemoprevention for her experiences and do research.  She will contact me if she decides to pursue.  Patient verbalizes understanding with above plan.  She will contact the cancer risk management clinic with any further questions or concerns.      Pain assessment was performed today as a part of patient’s care. For patients with pain related to surgery, gynecologic malignancy or cancer treatment, the plan is as noted in the assessment/plan.  For patients with pain not related to these issues, they are to seek any further needed care from a more appropriate provider, such as PCP.      I spent 60 minutes caring for Padma on this date of service. This time includes time spent by me in the following activities: preparing for the visit, reviewing tests, obtaining and/or reviewing a separately obtained history, performing a medically appropriate examination and/or evaluation, counseling and educating the patient/family/caregiver, ordering medications, tests, or procedures, and documenting information in the medical record.           Return to clinic in 1 year for High Risk visit.      Electronically signed by VIJAY Gutierrez on 06/24/24 at 12:56 EDT

## 2024-06-25 ENCOUNTER — TELEPHONE (OUTPATIENT)
Dept: MAMMOGRAPHY | Facility: HOSPITAL | Age: 44
End: 2024-06-25
Payer: COMMERCIAL

## 2024-06-25 NOTE — TELEPHONE ENCOUNTER
Returned call to patient, questions answered, support given. Encouraged calling Breast Imaging with further questions, verbalized understanding.

## 2024-07-01 ENCOUNTER — HOSPITAL ENCOUNTER (OUTPATIENT)
Dept: MAMMOGRAPHY | Facility: HOSPITAL | Age: 44
Discharge: HOME | End: 2024-07-01
Payer: COMMERCIAL

## 2024-07-01 ENCOUNTER — HOSPITAL ENCOUNTER (OUTPATIENT)
Dept: ULTRASOUND IMAGING | Facility: HOSPITAL | Age: 44
Discharge: HOME | End: 2024-07-01
Payer: COMMERCIAL

## 2024-07-01 DIAGNOSIS — R92.8 ABNORMAL MAMMOGRAM: ICD-10-CM

## 2024-07-01 PROCEDURE — 88305 TISSUE EXAM BY PATHOLOGIST: CPT | Performed by: RADIOLOGY

## 2024-07-01 PROCEDURE — A4648 IMPLANTABLE TISSUE MARKER: HCPCS

## 2024-07-01 PROCEDURE — 25010000002 LIDOCAINE 1 % SOLUTION: Performed by: RADIOLOGY

## 2024-07-01 RX ORDER — LIDOCAINE HYDROCHLORIDE AND EPINEPHRINE 10; 10 MG/ML; UG/ML
10 INJECTION, SOLUTION INFILTRATION; PERINEURAL ONCE
Status: COMPLETED | OUTPATIENT
Start: 2024-07-01 | End: 2024-07-01

## 2024-07-01 RX ORDER — LIDOCAINE HYDROCHLORIDE 10 MG/ML
5 INJECTION, SOLUTION INFILTRATION; PERINEURAL ONCE
Status: COMPLETED | OUTPATIENT
Start: 2024-07-01 | End: 2024-07-01

## 2024-07-01 RX ADMIN — LIDOCAINE HYDROCHLORIDE,EPINEPHRINE BITARTRATE 7 ML: 10; .01 INJECTION, SOLUTION INFILTRATION; PERINEURAL at 15:46

## 2024-07-01 RX ADMIN — Medication 2 ML: at 15:45

## 2024-07-01 NOTE — PROGRESS NOTES
Alert and oriented. Denies discomfort, no active bleeding, steri-strips not visualized, gauze dressing intact. Cold packs given. Questions answered, support given. Verbalizes and demonstrates understanding of post-care instructions, written copy given.      Rhombic Flap Text: The defect edges were debeveled with a #15 scalpel blade.  Given the location of the defect and the proximity to free margins a rhombic flap was deemed most appropriate.  Using a sterile surgical marker, an appropriate rhombic flap was drawn incorporating the defect.    The area thus outlined was incised deep to adipose tissue with a #15 scalpel blade.  The skin margins were undermined to an appropriate distance in all directions utilizing iris scissors.

## 2024-07-05 ENCOUNTER — TELEPHONE (OUTPATIENT)
Dept: MAMMOGRAPHY | Facility: HOSPITAL | Age: 44
End: 2024-07-05
Payer: COMMERCIAL

## 2024-07-05 ENCOUNTER — TRANSCRIBE ORDERS (OUTPATIENT)
Dept: ADMINISTRATIVE | Facility: HOSPITAL | Age: 44
End: 2024-07-05
Payer: COMMERCIAL

## 2024-07-05 DIAGNOSIS — R92.8 ABNORMAL MAMMOGRAM: Primary | ICD-10-CM

## 2024-07-05 NOTE — TELEPHONE ENCOUNTER
Attempted to notify patient of results and recommendation for follow up.  A message was left on her voicemail to return my call.

## 2024-07-05 NOTE — TELEPHONE ENCOUNTER
Patient notified of pathology results and recommendation. Verbalizes understanding. Denies discomfort. Denies signs and symptoms of infection. Questions answered, support given, verbalized understanding. Patient transferred to [facility] scheduling per request to schedule recommended follow-up.

## 2024-08-29 ENCOUNTER — HOSPITAL ENCOUNTER (OUTPATIENT)
Dept: MRI IMAGING | Facility: HOSPITAL | Age: 44
Discharge: HOME | End: 2024-08-29
Admitting: NURSE PRACTITIONER
Payer: COMMERCIAL

## 2024-08-29 DIAGNOSIS — Z91.89 AT HIGH RISK FOR BREAST CANCER: ICD-10-CM

## 2024-08-29 DIAGNOSIS — Z80.3 FAMILY HISTORY OF BREAST CANCER IN FEMALE: ICD-10-CM

## 2024-08-29 PROCEDURE — 77049 MRI BREAST C-+ W/CAD BI: CPT

## 2024-08-29 PROCEDURE — A9577 INJ MULTIHANCE: HCPCS | Performed by: NURSE PRACTITIONER

## 2024-08-29 PROCEDURE — 0 GADOBENATE DIMEGLUMINE 529 MG/ML SOLUTION: Performed by: NURSE PRACTITIONER

## 2024-08-29 RX ADMIN — GADOBENATE DIMEGLUMINE 18 ML: 529 INJECTION, SOLUTION INTRAVENOUS at 15:51

## 2025-01-02 ENCOUNTER — HOSPITAL ENCOUNTER (OUTPATIENT)
Dept: ULTRASOUND IMAGING | Facility: HOSPITAL | Age: 45
Discharge: HOME | End: 2025-01-02
Admitting: NURSE PRACTITIONER
Payer: COMMERCIAL

## 2025-01-02 DIAGNOSIS — R92.8 ABNORMAL MAMMOGRAM: ICD-10-CM

## 2025-01-02 PROCEDURE — 76642 ULTRASOUND BREAST LIMITED: CPT

## 2025-01-02 PROCEDURE — 76642 ULTRASOUND BREAST LIMITED: CPT | Performed by: RADIOLOGY

## 2025-06-24 ENCOUNTER — OFFICE VISIT (OUTPATIENT)
Dept: RADIATION ONCOLOGY | Facility: HOSPITAL | Age: 45
End: 2025-06-24
Payer: COMMERCIAL

## 2025-06-24 VITALS
BODY MASS INDEX: 34.21 KG/M2 | RESPIRATION RATE: 16 BRPM | HEART RATE: 80 BPM | HEIGHT: 67 IN | WEIGHT: 218 LBS | DIASTOLIC BLOOD PRESSURE: 92 MMHG | SYSTOLIC BLOOD PRESSURE: 135 MMHG | TEMPERATURE: 97.4 F | OXYGEN SATURATION: 99 %

## 2025-06-24 DIAGNOSIS — Z80.3 FAMILY HISTORY OF BREAST CANCER IN FEMALE: ICD-10-CM

## 2025-06-24 DIAGNOSIS — Z91.89 AT HIGH RISK FOR BREAST CANCER: Primary | ICD-10-CM

## 2025-06-24 PROCEDURE — G0463 HOSPITAL OUTPT CLINIC VISIT: HCPCS

## 2025-06-24 NOTE — PROGRESS NOTES
[facility]     Padma Arora  [id number]  1980    Subjective   Chief Complaint: At high risk for breast cancer      HISTORY OF PRESENT ILLNESS:       Padma Arora is a 45 y.o. year old female here today for her annual high risk visit.     She underwent genetic testing May 2024 which was negative for pathogenic mutations by sequencing, rearrangement testing, and RNA analysis for the 34 genes on the CancerNext panel. Based on her significant family history on breast cancer on her maternal side, the SIRS-Labzick version 8.0b was used to calculate Ms. Arora's lifetime breast cancer risk.  Her estimated lifetime breast cancer risk is 23.5%.  This model predicts the average 43 y.o. unaffected female's lifetime risk of breast cancer to be 10.5%.  The patient's lifetime breast cancer risk is an estimate based on available information and may change over time.     Interval History:  She denies any current breast concerns, reports she is fairly regular with monthly self breast exams.  Still has Mirena IUD in place.  No new health problems within the last year.  No new family diagnoses of cancer.  Patient's bilateral screening mammo 6/2024 was a BI-RADS 4, right breast hypoechoic mass.  She underwent breast biopsy on 7/1/2024 which resulted of fibroadenoma.  Her breast MRI 8/29/2024 was benign, BI-RADS 1.  Follow-up right breast ultrasound 1/2/2025 was benign BI-RADS 2.    The current medication list and allergy list were reviewed and reconciled.     Past Medical History, Past Surgical History, Social History, Family History have been reviewed and are without significant changes except as mentioned.    Health Maintenance:    Regular self breast exam: yes  Mammogram: 6/2024. History of abnormal mammogram: yes - fibroadenoma 7/2024, follow-up right breast ultrasound 1/2025 was a BI-RADS 2.  Breast MRI: 8/2024. Results: negative    Risk Asessment: Natan-Molly: 23.5%      Review of Systems   All  "other systems reviewed and are negative.      Objective   Physical Exam  Vital Signs: /92   Pulse 80   Temp 97.4 °F (36.3 °C) (Skin)   Resp 16   Ht 170.2 cm (67\")   Wt 98.9 kg (218 lb)   SpO2 99%   BMI 34.14 kg/m²   Vitals:    06/24/25 1032   PainSc: 0-No pain           General Appearance:  alert, cooperative, no apparent distress and appears stated age   Neurologic/Psych: A&O x 3, gait steady, appropriate affect   Breasts:  Symmetrical, no masses, no lesions, and no nipple discharge   Abdomen:   Soft, non-tender, non-distended, and no organomegaly   Lymph nodes: No cervical, supraclavicular, inguinal or axillary adenopathy noted   Skin: No rashes, ulcers, or suspicious lesions noted   Pelvic: deferred       Result Review :     BILATERAL BREAST MRI WITH CONTRAST     CLINICAL HISTORY: High risk annual screening breast MRI, Tyrer-Cuzick  risk assessment score of 23.5%. Family history of several maternal aunts  and a maternal grandmother previously diagnosed with breast malignancy.      TECHNIQUE: MRI was performed on a 1.5 Lilian magnet utilizing an  8-channel sentinel breast coil. Precontrast spin-echo T1-weighted and  T2-weighted sequences were obtained in the axial plane. Routine dynamic  images were performed following the administration of 18 mL of  MultiHance contrast. Four postcontrast runs were obtained. No contrast  complications occurred. Delayed high resolution postcontrast T1 weighted  sagittal images were also obtained. A CAD system ([employer]) was utilized  for data analysis.     COMPARISON: Previous breast MRI from 8/2/2023 as well as 6/28/2022.  Previous diagnostic mammogram from 6/7/2024.     FINDINGS: There are scattered fibroglandular tissues. There is mild  background contrast enhancement of the fibroglandular tissue. Contrast  within the heart is indicative of an adequate contrast bolus.     There are no suspicious areas of contrast enhancement or suspicious  enhancing masses in either " "breast.     There is no axillary or internal mammary adenopathy.     IMPRESSION:  No suspicious enhancement in either breast to suggest  malignancy.     BI-RADS CATEGORY: 1, NEGATIVE     RECOMMENDATIONS: Annual high rescreening with breast MRI given the  patient's lifetime calculated risk of breast malignancy is greater than  20%.     This report was finalized on 9/3/2024 12:48 PM by Shanell Block MD.    14G ELEVATION VACUUM-ASSISTED ULTRASOUND GUIDED CORE BIOPSY     HISTORY: 44-year-old patient with a 0.8 cm oval mass in the right 7  o'clock position.     PROCEDURE: Written and verbal consent was obtained for ultrasound guided  core biopsy of a 0.8 cm right breast mass located in the 7 o'clock  position.  \"Time out\" was observed to verify the patient's identity and  correct location of the breast abnormality. The presence of the lesion  was confirmed ultrasound and a lateral approach was chosen. The breast  was prepped and draped in the usual sterile fashion. 7 cc of 1%  lidocaine with epinephrine and 2 cc of 1% lidocaine without epinephrine  were utilized for local anesthesia. A small skin incision was biopsy  probe was placed inferior to the mass. A total of 5 core samples were  obtained. The specimens were placed in formalin and forwarded to the  Pathology Department. A Vision post biopsy marking clip was placed. Post  procedure mammogram for marker placement was performed. The clip is  well-positioned.     Upon completion of the procedure, compression was applied to the biopsy  site until all appreciable bleeding subsided and a sterile dressing was  applied. Post biopsy instructions were reviewed with the patient by our  clinical breast imaging staff. A written copy of these instructions was  also given to the patient. The patient tolerated the procedure well and  no immediate complications occurred.     SUMMARY: 14-gauge ultrasound guided and vacuum assisted core biopsy of a  0.8 cm right breast mass located at " 7:00.  A Vision post biopsy marking  clip was placed. The clip is well-positioned.     PATHOLOGY: Fibroadenoma with proliferative fibrocystic change. Negative  for atypia and malignancy.     IMPRESSION:  Pathology results are concordant with imaging.     RECOMMENDATION:  1. Short interval right 7:00 ultrasound follow-up in 6 months.  2. Annual high-risk screening breast MRI imaging. The patient's last  breast MRI study was performed on 8/20/2024.    Narrative & Impression   DIAGNOSTIC TARGETED RIGHT BREAST ULTRASOUND     CLINICAL INDICATION: Short-term interval targeted ultrasound follow-up  on the right at 7:00 7 cm the nipple for the previously biopsied benign  mass representing a fibroadenoma.     TECHNIQUE: Multiple transverse and sagittal images of the right breast  were obtained.     COMPARISON: 7/1/2024, 6/7/2024     FINDINGS: Targeted ultrasound was performed by myself as well as the  technologist. There is a stable parallel oval hypoechoic mass measuring  0.7 cm in maximal extent. Measurements in the oblique dimension are  difficult given presence of vision clip clearly demonstrated on images.  No significant change on my real-time imaging performed by myself.     IMPRESSION:  Stable appearance of previously biopsied benign  fibroadenoma.     ACR BI-RADS CATEGORY: 2, BENIGN     RECOMMENDATION: Patient may return to routine annual screening  mammography. Patient is also undergoing high risk annual screening with  breast MRI     A letter, in lay terminology, with the results of this exam was given to  the patient at the time of the visit.           This report was finalized on 1/2/2025 5:34 PM by Shanell Block MD.       Assessment and Plan:    Diagnoses and all orders for this visit:    1. At high risk for breast cancer (Primary)  -     Mammo Screening Digital Tomosynthesis Bilateral With CAD; Future  -     MRI Breast Bilateral Screening With & Without Contrast; Future    2. Family history of breast cancer in  female  -     Mammo Screening Digital Tomosynthesis Bilateral With CAD; Future  -     MRI Breast Bilateral Screening With & Without Contrast; Future          Patient Education:  Reviewed screening schedule related to diagnosis as follows:    -Monthly breast self-exam starting at age 18.  Patient reports she is fairly regular on performing self breast exams.    -Clinical breast exam every 6-12 months. To begin when identified as being at increased risk. The patient will alternate between cancer risk management clinic and her OB/GYN for clinical breast exams every 6 months.     -Consider referral to a genetic counselor or other health professional with expertise and experience in cancer genetics, if not already done.  Completed 5/2024    -Annual screening mammogram, with tomosynthesis to begin no later than age 40 y, or 10 y prior to when the youngest family member was diagnosed with breast cancer, not prior to age 30 ys (whichever comes first) Patient will be due for annual screening mammogram 7/2025, orders placed.    -Annual breast MRI with and without contrast to begin no later than age 40 y, or 10 years prior to when the youngest family memberr was diagnosed with breast cancer, not prior to age 25 y (whichever comes first) patient's last breast MRI was completed 8/2024.  We will spread out her breast imaging every 6 months, her annual mammogram is due 7/2025 so we will plan on doing her breast MRI 12/2025.    -Consider chemoprevention for breast cancer risk reduction (tamoxifen/evista).  Deferred at this time      Pain assessment was performed today as a part of patient’s care. For patients with pain related to surgery, gynecologic malignancy or cancer treatment, the plan is as noted in the assessment/plan.  For patients with pain not related to these issues, they are to seek any further needed care from a more appropriate provider, such as PCP.      I spent 60 minutes caring for Padma on this date of service.  This time includes time spent by me in the following activities: preparing for the visit, reviewing tests, obtaining and/or reviewing a separately obtained history, performing a medically appropriate examination and/or evaluation, ordering medications, tests, or procedures, and documenting information in the medical record.           Return to clinic in 1 year for High Risk visit.      Electronically signed by VIJAY Gutierrez on 06/24/25 at 11:05 EDT

## 2025-07-09 ENCOUNTER — RESULTS FOLLOW-UP (OUTPATIENT)
Facility: HOSPITAL | Age: 45
End: 2025-07-09
Payer: COMMERCIAL

## 2025-07-09 LAB
NCCN CRITERIA FLAG: ABNORMAL
TYRER CUZICK SCORE: 27.6

## 2025-07-14 ENCOUNTER — DOCUMENTATION (OUTPATIENT)
Dept: GENETICS | Facility: HOSPITAL | Age: 45
End: 2025-07-14
Payer: COMMERCIAL

## 2025-07-14 NOTE — PROGRESS NOTES
Meets NCCN Criteria for Genetic Testing  Declines genetic testing? Y   Reason for decline? Previous Testing   If Reason for Decline is Previous Testing    [facility] CARE N   Non-CARE Y   Non-CARE Confirmation    Navigator Confirmed? Y   Patient Reported?     Elevated Tyrer-Cuzick Score ? Or Equal to 20%    Declines referral? Y   Reason for decline? Currently Following High Risk Care Plan

## 2025-07-14 NOTE — PROGRESS NOTES
This patient recently completed the CARE risk assessment for a mammogram appointment. Based on the responses, the patient meets NCCN criteria for genetic testing and the Tyrer-Cuzick breast cancer risk score is > 20. At the time of the assessment, the patient was provided with both written and video educational materials regarding genetic testing.     Navigator follow-up:    The patient underwent genetic counseling and genetic testing in 2024. The [employer] CancerNext panel with [employer] was ordered which analyzed 34 cancer risk genes. Genetic testing wad negative for pathogenic mutations in all 34 cancer risk genes analyzed. No additional genetic testing is indicated at this time.     The patient is currently being followed in the high risk clinic due to a previously calculated Tyrer-Cuzick score. No additional referrals are indicated at this time.

## 2025-07-24 ENCOUNTER — HOSPITAL ENCOUNTER (OUTPATIENT)
Dept: MAMMOGRAPHY | Facility: HOSPITAL | Age: 45
Discharge: HOME | End: 2025-07-24
Admitting: NURSE PRACTITIONER
Payer: COMMERCIAL

## 2025-07-24 DIAGNOSIS — Z91.89 AT HIGH RISK FOR BREAST CANCER: ICD-10-CM

## 2025-07-24 DIAGNOSIS — Z80.3 FAMILY HISTORY OF BREAST CANCER IN FEMALE: ICD-10-CM

## 2025-07-24 PROCEDURE — 77063 BREAST TOMOSYNTHESIS BI: CPT

## 2025-07-24 PROCEDURE — 77067 SCR MAMMO BI INCL CAD: CPT
